# Patient Record
Sex: MALE | Race: WHITE
[De-identification: names, ages, dates, MRNs, and addresses within clinical notes are randomized per-mention and may not be internally consistent; named-entity substitution may affect disease eponyms.]

---

## 2024-07-25 ENCOUNTER — APPOINTMENT (OUTPATIENT)
Facility: HOSPITAL | Age: 74
End: 2024-07-25
Payer: MEDICARE

## 2024-07-25 ENCOUNTER — HOSPITAL ENCOUNTER (INPATIENT)
Facility: HOSPITAL | Age: 74
LOS: 7 days | Discharge: HOME | End: 2024-08-01
Attending: EMERGENCY MEDICINE | Admitting: INTERNAL MEDICINE
Payer: MEDICARE

## 2024-07-25 DIAGNOSIS — S73.004A DISLOCATION OF RIGHT HIP, INITIAL ENCOUNTER (HCC): Primary | ICD-10-CM

## 2024-07-25 PROBLEM — T84.020A: Status: ACTIVE | Noted: 2024-07-25

## 2024-07-25 LAB
ALBUMIN SERPL-MCNC: 3.2 G/DL (ref 3.5–5)
ALBUMIN/GLOB SERPL: 0.9 (ref 1.1–2.2)
ALP SERPL-CCNC: 164 U/L (ref 45–117)
ALT SERPL-CCNC: 27 U/L (ref 12–78)
ANION GAP SERPL CALC-SCNC: 6 MMOL/L (ref 5–15)
AST SERPL-CCNC: 29 U/L (ref 15–37)
BASOPHILS # BLD: 0 K/UL (ref 0–0.1)
BASOPHILS NFR BLD: 0 % (ref 0–1)
BILIRUB SERPL-MCNC: 0.4 MG/DL (ref 0.2–1)
BUN SERPL-MCNC: 23 MG/DL (ref 6–20)
BUN/CREAT SERPL: 12 (ref 12–20)
CALCIUM SERPL-MCNC: 8.6 MG/DL (ref 8.5–10.1)
CHLORIDE SERPL-SCNC: 103 MMOL/L (ref 97–108)
CO2 SERPL-SCNC: 27 MMOL/L (ref 21–32)
COMMENT:: NORMAL
CREAT SERPL-MCNC: 1.96 MG/DL (ref 0.7–1.3)
DIFFERENTIAL METHOD BLD: ABNORMAL
EOSINOPHIL # BLD: 0.1 K/UL (ref 0–0.4)
EOSINOPHIL NFR BLD: 1 % (ref 0–7)
ERYTHROCYTE [DISTWIDTH] IN BLOOD BY AUTOMATED COUNT: 13.9 % (ref 11.5–14.5)
GLOBULIN SER CALC-MCNC: 3.7 G/DL (ref 2–4)
GLUCOSE SERPL-MCNC: 171 MG/DL (ref 65–100)
HCT VFR BLD AUTO: 26.5 % (ref 36.6–50.3)
HGB BLD-MCNC: 8.3 G/DL (ref 12.1–17)
IMM GRANULOCYTES # BLD AUTO: 0.1 K/UL (ref 0–0.04)
IMM GRANULOCYTES NFR BLD AUTO: 1 % (ref 0–0.5)
LYMPHOCYTES # BLD: 1.2 K/UL (ref 0.8–3.5)
LYMPHOCYTES NFR BLD: 11 % (ref 12–49)
MCH RBC QN AUTO: 27.4 PG (ref 26–34)
MCHC RBC AUTO-ENTMCNC: 31.3 G/DL (ref 30–36.5)
MCV RBC AUTO: 87.5 FL (ref 80–99)
MONOCYTES # BLD: 0.5 K/UL (ref 0–1)
MONOCYTES NFR BLD: 5 % (ref 5–13)
NEUTS SEG # BLD: 9.3 K/UL (ref 1.8–8)
NEUTS SEG NFR BLD: 82 % (ref 32–75)
NRBC # BLD: 0 K/UL (ref 0–0.01)
NRBC BLD-RTO: 0 PER 100 WBC
PLATELET # BLD AUTO: 377 K/UL (ref 150–400)
PMV BLD AUTO: 10.6 FL (ref 8.9–12.9)
POTASSIUM SERPL-SCNC: 3.8 MMOL/L (ref 3.5–5.1)
PROT SERPL-MCNC: 6.9 G/DL (ref 6.4–8.2)
RBC # BLD AUTO: 3.03 M/UL (ref 4.1–5.7)
SODIUM SERPL-SCNC: 136 MMOL/L (ref 136–145)
SPECIMEN HOLD: NORMAL
WBC # BLD AUTO: 11.2 K/UL (ref 4.1–11.1)

## 2024-07-25 PROCEDURE — 2500000003 HC RX 250 WO HCPCS: Performed by: EMERGENCY MEDICINE

## 2024-07-25 PROCEDURE — 73502 X-RAY EXAM HIP UNI 2-3 VIEWS: CPT

## 2024-07-25 PROCEDURE — 27265 TREAT HIP DISLOCATION: CPT | Performed by: PHYSICIAN ASSISTANT

## 2024-07-25 PROCEDURE — 80053 COMPREHEN METABOLIC PANEL: CPT

## 2024-07-25 PROCEDURE — 36415 COLL VENOUS BLD VENIPUNCTURE: CPT

## 2024-07-25 PROCEDURE — 2580000003 HC RX 258: Performed by: EMERGENCY MEDICINE

## 2024-07-25 PROCEDURE — 85025 COMPLETE CBC W/AUTO DIFF WBC: CPT

## 2024-07-25 PROCEDURE — 99285 EMERGENCY DEPT VISIT HI MDM: CPT

## 2024-07-25 PROCEDURE — 73700 CT LOWER EXTREMITY W/O DYE: CPT

## 2024-07-25 PROCEDURE — 6360000002 HC RX W HCPCS: Performed by: EMERGENCY MEDICINE

## 2024-07-25 PROCEDURE — 1100000000 HC RM PRIVATE

## 2024-07-25 PROCEDURE — 73501 X-RAY EXAM HIP UNI 1 VIEW: CPT

## 2024-07-25 PROCEDURE — 96374 THER/PROPH/DIAG INJ IV PUSH: CPT

## 2024-07-25 RX ORDER — HYDROMORPHONE HYDROCHLORIDE 1 MG/ML
0.5 INJECTION, SOLUTION INTRAMUSCULAR; INTRAVENOUS; SUBCUTANEOUS
Status: COMPLETED | OUTPATIENT
Start: 2024-07-25 | End: 2024-07-25

## 2024-07-25 RX ORDER — 0.9 % SODIUM CHLORIDE 0.9 %
1000 INTRAVENOUS SOLUTION INTRAVENOUS ONCE
Status: COMPLETED | OUTPATIENT
Start: 2024-07-25 | End: 2024-07-25

## 2024-07-25 RX ADMIN — PROPOFOL 300 MG: 10 INJECTION, EMULSION INTRAVENOUS at 22:30

## 2024-07-25 RX ADMIN — HYDROMORPHONE HYDROCHLORIDE 0.5 MG: 1 INJECTION, SOLUTION INTRAMUSCULAR; INTRAVENOUS; SUBCUTANEOUS at 22:04

## 2024-07-25 RX ADMIN — SODIUM CHLORIDE 1000 ML: 9 INJECTION, SOLUTION INTRAVENOUS at 22:22

## 2024-07-25 ASSESSMENT — PAIN DESCRIPTION - ONSET: ONSET: SUDDEN

## 2024-07-25 ASSESSMENT — LIFESTYLE VARIABLES
HOW OFTEN DO YOU HAVE A DRINK CONTAINING ALCOHOL: NEVER
HOW MANY STANDARD DRINKS CONTAINING ALCOHOL DO YOU HAVE ON A TYPICAL DAY: PATIENT DOES NOT DRINK

## 2024-07-25 ASSESSMENT — PAIN SCALES - GENERAL
PAINLEVEL_OUTOF10: 10
PAINLEVEL_OUTOF10: 10

## 2024-07-25 ASSESSMENT — PAIN - FUNCTIONAL ASSESSMENT
PAIN_FUNCTIONAL_ASSESSMENT: PREVENTS OR INTERFERES WITH ALL ACTIVE AND SOME PASSIVE ACTIVITIES
PAIN_FUNCTIONAL_ASSESSMENT: PREVENTS OR INTERFERES WITH ALL ACTIVE AND SOME PASSIVE ACTIVITIES
PAIN_FUNCTIONAL_ASSESSMENT: 0-10

## 2024-07-25 ASSESSMENT — PAIN DESCRIPTION - ORIENTATION
ORIENTATION: RIGHT
ORIENTATION: RIGHT

## 2024-07-25 ASSESSMENT — PAIN DESCRIPTION - PAIN TYPE: TYPE: ACUTE PAIN

## 2024-07-25 ASSESSMENT — PAIN DESCRIPTION - LOCATION
LOCATION: HIP
LOCATION: HIP

## 2024-07-25 ASSESSMENT — PAIN DESCRIPTION - DESCRIPTORS
DESCRIPTORS: SHARP
DESCRIPTORS: SHARP

## 2024-07-25 ASSESSMENT — PAIN DESCRIPTION - FREQUENCY: FREQUENCY: CONTINUOUS

## 2024-07-26 ENCOUNTER — APPOINTMENT (OUTPATIENT)
Facility: HOSPITAL | Age: 74
End: 2024-07-26
Payer: MEDICARE

## 2024-07-26 ENCOUNTER — ANESTHESIA EVENT (OUTPATIENT)
Facility: HOSPITAL | Age: 74
End: 2024-07-26
Payer: MEDICARE

## 2024-07-26 ENCOUNTER — ANESTHESIA (OUTPATIENT)
Facility: HOSPITAL | Age: 74
End: 2024-07-26
Payer: MEDICARE

## 2024-07-26 LAB
ALBUMIN SERPL-MCNC: 2.8 G/DL (ref 3.5–5)
ALBUMIN/GLOB SERPL: 0.9 (ref 1.1–2.2)
ALP SERPL-CCNC: 146 U/L (ref 45–117)
ALT SERPL-CCNC: 29 U/L (ref 12–78)
ANION GAP SERPL CALC-SCNC: 6 MMOL/L (ref 5–15)
AST SERPL-CCNC: 30 U/L (ref 15–37)
BASOPHILS # BLD: 0 K/UL (ref 0–0.1)
BASOPHILS NFR BLD: 0 % (ref 0–1)
BILIRUB SERPL-MCNC: 0.3 MG/DL (ref 0.2–1)
BUN SERPL-MCNC: 26 MG/DL (ref 6–20)
BUN/CREAT SERPL: 15 (ref 12–20)
CALCIUM SERPL-MCNC: 8.7 MG/DL (ref 8.5–10.1)
CHLORIDE SERPL-SCNC: 107 MMOL/L (ref 97–108)
CO2 SERPL-SCNC: 26 MMOL/L (ref 21–32)
CREAT SERPL-MCNC: 1.74 MG/DL (ref 0.7–1.3)
DIFFERENTIAL METHOD BLD: ABNORMAL
EOSINOPHIL # BLD: 0.1 K/UL (ref 0–0.4)
EOSINOPHIL NFR BLD: 2 % (ref 0–7)
ERYTHROCYTE [DISTWIDTH] IN BLOOD BY AUTOMATED COUNT: 13.9 % (ref 11.5–14.5)
EST. AVERAGE GLUCOSE BLD GHB EST-MCNC: 114 MG/DL
GLOBULIN SER CALC-MCNC: 3.2 G/DL (ref 2–4)
GLUCOSE BLD STRIP.AUTO-MCNC: 129 MG/DL (ref 65–117)
GLUCOSE BLD STRIP.AUTO-MCNC: 136 MG/DL (ref 65–117)
GLUCOSE BLD STRIP.AUTO-MCNC: 158 MG/DL (ref 65–117)
GLUCOSE BLD STRIP.AUTO-MCNC: 172 MG/DL (ref 65–117)
GLUCOSE BLD STRIP.AUTO-MCNC: 178 MG/DL (ref 65–117)
GLUCOSE SERPL-MCNC: 128 MG/DL (ref 65–100)
HBA1C MFR BLD: 5.6 % (ref 4–5.6)
HCT VFR BLD AUTO: 24.5 % (ref 36.6–50.3)
HGB BLD-MCNC: 7.6 G/DL (ref 12.1–17)
IMM GRANULOCYTES # BLD AUTO: 0 K/UL (ref 0–0.04)
IMM GRANULOCYTES NFR BLD AUTO: 1 % (ref 0–0.5)
LYMPHOCYTES # BLD: 1.7 K/UL (ref 0.8–3.5)
LYMPHOCYTES NFR BLD: 19 % (ref 12–49)
MAGNESIUM SERPL-MCNC: 2 MG/DL (ref 1.6–2.4)
MCH RBC QN AUTO: 27.1 PG (ref 26–34)
MCHC RBC AUTO-ENTMCNC: 31 G/DL (ref 30–36.5)
MCV RBC AUTO: 87.5 FL (ref 80–99)
MONOCYTES # BLD: 0.5 K/UL (ref 0–1)
MONOCYTES NFR BLD: 6 % (ref 5–13)
NEUTS SEG # BLD: 6.4 K/UL (ref 1.8–8)
NEUTS SEG NFR BLD: 72 % (ref 32–75)
NRBC # BLD: 0 K/UL (ref 0–0.01)
NRBC BLD-RTO: 0 PER 100 WBC
PHOSPHATE SERPL-MCNC: 3.2 MG/DL (ref 2.6–4.7)
PLATELET # BLD AUTO: 328 K/UL (ref 150–400)
PMV BLD AUTO: 11.2 FL (ref 8.9–12.9)
POTASSIUM SERPL-SCNC: 3.9 MMOL/L (ref 3.5–5.1)
PROT SERPL-MCNC: 6 G/DL (ref 6.4–8.2)
RBC # BLD AUTO: 2.8 M/UL (ref 4.1–5.7)
SERVICE CMNT-IMP: ABNORMAL
SODIUM SERPL-SCNC: 139 MMOL/L (ref 136–145)
TSH SERPL DL<=0.05 MIU/L-ACNC: 1.53 UIU/ML (ref 0.36–3.74)
WBC # BLD AUTO: 8.7 K/UL (ref 4.1–11.1)

## 2024-07-26 PROCEDURE — 3600000002 HC SURGERY LEVEL 2 BASE: Performed by: ORTHOPAEDIC SURGERY

## 2024-07-26 PROCEDURE — 2500000003 HC RX 250 WO HCPCS: Performed by: INTERNAL MEDICINE

## 2024-07-26 PROCEDURE — 82962 GLUCOSE BLOOD TEST: CPT

## 2024-07-26 PROCEDURE — 6370000000 HC RX 637 (ALT 250 FOR IP): Performed by: INTERNAL MEDICINE

## 2024-07-26 PROCEDURE — 6360000002 HC RX W HCPCS

## 2024-07-26 PROCEDURE — 2500000003 HC RX 250 WO HCPCS

## 2024-07-26 PROCEDURE — 85025 COMPLETE CBC W/AUTO DIFF WBC: CPT

## 2024-07-26 PROCEDURE — 2580000003 HC RX 258

## 2024-07-26 PROCEDURE — 97161 PT EVAL LOW COMPLEX 20 MIN: CPT

## 2024-07-26 PROCEDURE — 84443 ASSAY THYROID STIM HORMONE: CPT

## 2024-07-26 PROCEDURE — 83735 ASSAY OF MAGNESIUM: CPT

## 2024-07-26 PROCEDURE — 0SW9XJZ REVISION OF SYNTHETIC SUBSTITUTE IN RIGHT HIP JOINT, EXTERNAL APPROACH: ICD-10-PCS | Performed by: ORTHOPAEDIC SURGERY

## 2024-07-26 PROCEDURE — 7100000000 HC PACU RECOVERY - FIRST 15 MIN: Performed by: ORTHOPAEDIC SURGERY

## 2024-07-26 PROCEDURE — 97116 GAIT TRAINING THERAPY: CPT

## 2024-07-26 PROCEDURE — 2580000003 HC RX 258: Performed by: INTERNAL MEDICINE

## 2024-07-26 PROCEDURE — 1100000000 HC RM PRIVATE

## 2024-07-26 PROCEDURE — 6360000002 HC RX W HCPCS: Performed by: ANESTHESIOLOGY

## 2024-07-26 PROCEDURE — 7100000001 HC PACU RECOVERY - ADDTL 15 MIN: Performed by: ORTHOPAEDIC SURGERY

## 2024-07-26 PROCEDURE — 80053 COMPREHEN METABOLIC PANEL: CPT

## 2024-07-26 PROCEDURE — 6360000002 HC RX W HCPCS: Performed by: ORTHOPAEDIC SURGERY

## 2024-07-26 PROCEDURE — 3700000000 HC ANESTHESIA ATTENDED CARE: Performed by: ORTHOPAEDIC SURGERY

## 2024-07-26 PROCEDURE — 3600000012 HC SURGERY LEVEL 2 ADDTL 15MIN: Performed by: ORTHOPAEDIC SURGERY

## 2024-07-26 PROCEDURE — 36415 COLL VENOUS BLD VENIPUNCTURE: CPT

## 2024-07-26 PROCEDURE — 84100 ASSAY OF PHOSPHORUS: CPT

## 2024-07-26 PROCEDURE — 83036 HEMOGLOBIN GLYCOSYLATED A1C: CPT

## 2024-07-26 PROCEDURE — 97530 THERAPEUTIC ACTIVITIES: CPT

## 2024-07-26 PROCEDURE — 3700000001 HC ADD 15 MINUTES (ANESTHESIA): Performed by: ORTHOPAEDIC SURGERY

## 2024-07-26 PROCEDURE — 2580000003 HC RX 258: Performed by: ORTHOPAEDIC SURGERY

## 2024-07-26 RX ORDER — LEVOTHYROXINE SODIUM 0.05 MG/1
50 TABLET ORAL
COMMUNITY
Start: 2021-05-21

## 2024-07-26 RX ORDER — ONDANSETRON 4 MG/1
4 TABLET, ORALLY DISINTEGRATING ORAL EVERY 8 HOURS PRN
Status: DISCONTINUED | OUTPATIENT
Start: 2024-07-26 | End: 2024-08-01 | Stop reason: HOSPADM

## 2024-07-26 RX ORDER — PROPOFOL 10 MG/ML
INJECTION, EMULSION INTRAVENOUS PRN
Status: DISCONTINUED | OUTPATIENT
Start: 2024-07-26 | End: 2024-07-26 | Stop reason: SDUPTHER

## 2024-07-26 RX ORDER — HYDROMORPHONE HYDROCHLORIDE 1 MG/ML
1 INJECTION, SOLUTION INTRAMUSCULAR; INTRAVENOUS; SUBCUTANEOUS EVERY 4 HOURS PRN
Status: DISCONTINUED | OUTPATIENT
Start: 2024-07-26 | End: 2024-07-26

## 2024-07-26 RX ORDER — SODIUM CHLORIDE 0.9 % (FLUSH) 0.9 %
5-40 SYRINGE (ML) INJECTION PRN
Status: DISCONTINUED | OUTPATIENT
Start: 2024-07-26 | End: 2024-07-26 | Stop reason: HOSPADM

## 2024-07-26 RX ORDER — SODIUM CHLORIDE 9 MG/ML
INJECTION, SOLUTION INTRAVENOUS PRN
Status: DISCONTINUED | OUTPATIENT
Start: 2024-07-26 | End: 2024-08-01 | Stop reason: HOSPADM

## 2024-07-26 RX ORDER — POTASSIUM CHLORIDE 750 MG/1
40 TABLET, FILM COATED, EXTENDED RELEASE ORAL PRN
Status: DISCONTINUED | OUTPATIENT
Start: 2024-07-26 | End: 2024-08-01 | Stop reason: HOSPADM

## 2024-07-26 RX ORDER — FENOFIBRATE 48 MG/1
96 TABLET, COATED ORAL
COMMUNITY
Start: 2024-07-03

## 2024-07-26 RX ORDER — LURASIDONE HYDROCHLORIDE 80 MG/1
80 TABLET, FILM COATED ORAL
Status: DISCONTINUED | OUTPATIENT
Start: 2024-07-26 | End: 2024-08-01 | Stop reason: HOSPADM

## 2024-07-26 RX ORDER — LIDOCAINE HYDROCHLORIDE 20 MG/ML
INJECTION, SOLUTION EPIDURAL; INFILTRATION; INTRACAUDAL; PERINEURAL PRN
Status: DISCONTINUED | OUTPATIENT
Start: 2024-07-26 | End: 2024-07-26 | Stop reason: SDUPTHER

## 2024-07-26 RX ORDER — SERTRALINE HYDROCHLORIDE 100 MG/1
200 TABLET, FILM COATED ORAL
COMMUNITY
Start: 2024-05-02

## 2024-07-26 RX ORDER — ASPIRIN 81 MG/1
81 TABLET, CHEWABLE ORAL DAILY
Status: DISCONTINUED | OUTPATIENT
Start: 2024-07-26 | End: 2024-08-01 | Stop reason: HOSPADM

## 2024-07-26 RX ORDER — LURASIDONE HYDROCHLORIDE 80 MG/1
80 TABLET, FILM COATED ORAL
COMMUNITY
Start: 2024-01-26

## 2024-07-26 RX ORDER — SODIUM CHLORIDE, SODIUM LACTATE, POTASSIUM CHLORIDE, CALCIUM CHLORIDE 600; 310; 30; 20 MG/100ML; MG/100ML; MG/100ML; MG/100ML
INJECTION, SOLUTION INTRAVENOUS CONTINUOUS
Status: DISCONTINUED | OUTPATIENT
Start: 2024-07-26 | End: 2024-07-30

## 2024-07-26 RX ORDER — FENTANYL CITRATE 50 UG/ML
25 INJECTION, SOLUTION INTRAMUSCULAR; INTRAVENOUS EVERY 5 MIN PRN
Status: DISCONTINUED | OUTPATIENT
Start: 2024-07-26 | End: 2024-07-26 | Stop reason: HOSPADM

## 2024-07-26 RX ORDER — PANTOPRAZOLE SODIUM 40 MG/1
40 TABLET, DELAYED RELEASE ORAL
COMMUNITY
Start: 2021-05-21

## 2024-07-26 RX ORDER — POTASSIUM CHLORIDE 7.45 MG/ML
10 INJECTION INTRAVENOUS PRN
Status: DISCONTINUED | OUTPATIENT
Start: 2024-07-26 | End: 2024-08-01 | Stop reason: HOSPADM

## 2024-07-26 RX ORDER — SODIUM CHLORIDE 0.9 % (FLUSH) 0.9 %
5-40 SYRINGE (ML) INJECTION EVERY 12 HOURS SCHEDULED
Status: DISCONTINUED | OUTPATIENT
Start: 2024-07-26 | End: 2024-07-26 | Stop reason: HOSPADM

## 2024-07-26 RX ORDER — ENOXAPARIN SODIUM 100 MG/ML
40 INJECTION SUBCUTANEOUS DAILY
Status: DISCONTINUED | OUTPATIENT
Start: 2024-07-26 | End: 2024-08-01 | Stop reason: HOSPADM

## 2024-07-26 RX ORDER — HYDROMORPHONE HYDROCHLORIDE 1 MG/ML
1 INJECTION, SOLUTION INTRAMUSCULAR; INTRAVENOUS; SUBCUTANEOUS EVERY 4 HOURS PRN
Status: DISCONTINUED | OUTPATIENT
Start: 2024-07-26 | End: 2024-07-30

## 2024-07-26 RX ORDER — ATORVASTATIN CALCIUM 80 MG/1
80 TABLET, FILM COATED ORAL
COMMUNITY
Start: 2021-05-21

## 2024-07-26 RX ORDER — ONDANSETRON 2 MG/ML
4 INJECTION INTRAMUSCULAR; INTRAVENOUS
Status: DISCONTINUED | OUTPATIENT
Start: 2024-07-26 | End: 2024-07-26 | Stop reason: HOSPADM

## 2024-07-26 RX ORDER — SODIUM CHLORIDE 9 MG/ML
INJECTION, SOLUTION INTRAVENOUS CONTINUOUS
Status: DISPENSED | OUTPATIENT
Start: 2024-07-26 | End: 2024-07-27

## 2024-07-26 RX ORDER — TOPIRAMATE 50 MG/1
50 TABLET, FILM COATED ORAL
COMMUNITY
Start: 2021-05-21

## 2024-07-26 RX ORDER — INSULIN LISPRO 100 [IU]/ML
0-8 INJECTION, SOLUTION INTRAVENOUS; SUBCUTANEOUS
Status: DISCONTINUED | OUTPATIENT
Start: 2024-07-26 | End: 2024-08-01 | Stop reason: HOSPADM

## 2024-07-26 RX ORDER — ROCURONIUM BROMIDE 10 MG/ML
INJECTION, SOLUTION INTRAVENOUS PRN
Status: DISCONTINUED | OUTPATIENT
Start: 2024-07-26 | End: 2024-07-26 | Stop reason: SDUPTHER

## 2024-07-26 RX ORDER — SODIUM CHLORIDE 9 MG/ML
INJECTION, SOLUTION INTRAVENOUS PRN
Status: DISCONTINUED | OUTPATIENT
Start: 2024-07-26 | End: 2024-07-26 | Stop reason: HOSPADM

## 2024-07-26 RX ORDER — ALBUTEROL SULFATE 90 UG/1
AEROSOL, METERED RESPIRATORY (INHALATION)
COMMUNITY
Start: 2020-03-03

## 2024-07-26 RX ORDER — ASPIRIN 81 MG/1
81 TABLET, CHEWABLE ORAL
COMMUNITY
Start: 2020-03-03

## 2024-07-26 RX ORDER — POLYETHYLENE GLYCOL 3350 17 G/17G
17 POWDER, FOR SOLUTION ORAL DAILY PRN
Status: DISCONTINUED | OUTPATIENT
Start: 2024-07-26 | End: 2024-08-01 | Stop reason: HOSPADM

## 2024-07-26 RX ORDER — OXYCODONE HYDROCHLORIDE 5 MG/1
10 TABLET ORAL EVERY 4 HOURS PRN
Status: DISCONTINUED | OUTPATIENT
Start: 2024-07-26 | End: 2024-07-26

## 2024-07-26 RX ORDER — ONDANSETRON 2 MG/ML
4 INJECTION INTRAMUSCULAR; INTRAVENOUS EVERY 6 HOURS PRN
Status: DISCONTINUED | OUTPATIENT
Start: 2024-07-26 | End: 2024-08-01 | Stop reason: HOSPADM

## 2024-07-26 RX ORDER — ATORVASTATIN CALCIUM 40 MG/1
80 TABLET, FILM COATED ORAL NIGHTLY
Status: DISCONTINUED | OUTPATIENT
Start: 2024-07-26 | End: 2024-08-01 | Stop reason: HOSPADM

## 2024-07-26 RX ORDER — VENLAFAXINE HYDROCHLORIDE 37.5 MG/1
75 CAPSULE, EXTENDED RELEASE ORAL
Status: DISCONTINUED | OUTPATIENT
Start: 2024-07-26 | End: 2024-07-26

## 2024-07-26 RX ORDER — SODIUM CHLORIDE 0.9 % (FLUSH) 0.9 %
5-40 SYRINGE (ML) INJECTION PRN
Status: DISCONTINUED | OUTPATIENT
Start: 2024-07-26 | End: 2024-08-01 | Stop reason: HOSPADM

## 2024-07-26 RX ORDER — SODIUM CHLORIDE 0.9 % (FLUSH) 0.9 %
5-40 SYRINGE (ML) INJECTION EVERY 12 HOURS SCHEDULED
Status: DISCONTINUED | OUTPATIENT
Start: 2024-07-26 | End: 2024-08-01 | Stop reason: HOSPADM

## 2024-07-26 RX ORDER — DEXTROSE MONOHYDRATE 100 MG/ML
INJECTION, SOLUTION INTRAVENOUS CONTINUOUS PRN
Status: DISCONTINUED | OUTPATIENT
Start: 2024-07-26 | End: 2024-08-01 | Stop reason: HOSPADM

## 2024-07-26 RX ORDER — ALBUTEROL SULFATE 90 UG/1
2 AEROSOL, METERED RESPIRATORY (INHALATION) EVERY 6 HOURS PRN
Status: DISCONTINUED | OUTPATIENT
Start: 2024-07-26 | End: 2024-08-01 | Stop reason: HOSPADM

## 2024-07-26 RX ORDER — PANTOPRAZOLE SODIUM 40 MG/1
40 TABLET, DELAYED RELEASE ORAL
Status: DISCONTINUED | OUTPATIENT
Start: 2024-07-26 | End: 2024-08-01 | Stop reason: HOSPADM

## 2024-07-26 RX ORDER — VENLAFAXINE HYDROCHLORIDE 75 MG/1
75 CAPSULE, EXTENDED RELEASE ORAL
Status: ON HOLD | COMMUNITY
Start: 2021-05-21 | End: 2024-07-26 | Stop reason: ALTCHOICE

## 2024-07-26 RX ORDER — INSULIN LISPRO 100 [IU]/ML
0-4 INJECTION, SOLUTION INTRAVENOUS; SUBCUTANEOUS NIGHTLY
Status: DISCONTINUED | OUTPATIENT
Start: 2024-07-26 | End: 2024-08-01 | Stop reason: HOSPADM

## 2024-07-26 RX ORDER — FENTANYL CITRATE 50 UG/ML
INJECTION, SOLUTION INTRAMUSCULAR; INTRAVENOUS PRN
Status: DISCONTINUED | OUTPATIENT
Start: 2024-07-26 | End: 2024-07-26 | Stop reason: SDUPTHER

## 2024-07-26 RX ORDER — FINASTERIDE 5 MG/1
5 TABLET, FILM COATED ORAL
COMMUNITY
Start: 2024-07-03

## 2024-07-26 RX ORDER — PHENOL 1.4 %
10 AEROSOL, SPRAY (ML) MUCOUS MEMBRANE NIGHTLY
COMMUNITY
Start: 2024-07-10

## 2024-07-26 RX ORDER — ACETAMINOPHEN 650 MG/1
650 SUPPOSITORY RECTAL EVERY 6 HOURS PRN
Status: DISCONTINUED | OUTPATIENT
Start: 2024-07-26 | End: 2024-08-01 | Stop reason: HOSPADM

## 2024-07-26 RX ORDER — HYDRALAZINE HYDROCHLORIDE 20 MG/ML
10 INJECTION INTRAMUSCULAR; INTRAVENOUS ONCE
Status: DISCONTINUED | OUTPATIENT
Start: 2024-07-26 | End: 2024-07-26 | Stop reason: HOSPADM

## 2024-07-26 RX ORDER — NALOXONE HYDROCHLORIDE 0.4 MG/ML
INJECTION, SOLUTION INTRAMUSCULAR; INTRAVENOUS; SUBCUTANEOUS PRN
Status: DISCONTINUED | OUTPATIENT
Start: 2024-07-26 | End: 2024-07-26 | Stop reason: HOSPADM

## 2024-07-26 RX ORDER — SUCCINYLCHOLINE/SOD CL,ISO/PF 200MG/10ML
SYRINGE (ML) INTRAVENOUS PRN
Status: DISCONTINUED | OUTPATIENT
Start: 2024-07-26 | End: 2024-07-26 | Stop reason: SDUPTHER

## 2024-07-26 RX ORDER — FENOFIBRATE 54 MG/1
54 TABLET ORAL DAILY
Status: DISCONTINUED | OUTPATIENT
Start: 2024-07-26 | End: 2024-08-01 | Stop reason: HOSPADM

## 2024-07-26 RX ORDER — HYDROMORPHONE HYDROCHLORIDE 1 MG/ML
0.5 INJECTION, SOLUTION INTRAMUSCULAR; INTRAVENOUS; SUBCUTANEOUS EVERY 5 MIN PRN
Status: DISCONTINUED | OUTPATIENT
Start: 2024-07-26 | End: 2024-07-26 | Stop reason: HOSPADM

## 2024-07-26 RX ORDER — MAGNESIUM SULFATE IN WATER 40 MG/ML
2000 INJECTION, SOLUTION INTRAVENOUS PRN
Status: DISCONTINUED | OUTPATIENT
Start: 2024-07-26 | End: 2024-08-01 | Stop reason: HOSPADM

## 2024-07-26 RX ORDER — OXYCODONE HYDROCHLORIDE 5 MG/1
5 TABLET ORAL
Status: DISCONTINUED | OUTPATIENT
Start: 2024-07-26 | End: 2024-07-26 | Stop reason: HOSPADM

## 2024-07-26 RX ORDER — SODIUM CHLORIDE, SODIUM LACTATE, POTASSIUM CHLORIDE, CALCIUM CHLORIDE 600; 310; 30; 20 MG/100ML; MG/100ML; MG/100ML; MG/100ML
INJECTION, SOLUTION INTRAVENOUS CONTINUOUS PRN
Status: DISCONTINUED | OUTPATIENT
Start: 2024-07-26 | End: 2024-07-26 | Stop reason: SDUPTHER

## 2024-07-26 RX ORDER — FERROUS SULFATE 325(65) MG
325 TABLET, DELAYED RELEASE (ENTERIC COATED) ORAL
COMMUNITY
Start: 2024-07-03

## 2024-07-26 RX ORDER — BUPROPION HYDROCHLORIDE 150 MG/1
150 TABLET ORAL DAILY
Status: DISCONTINUED | OUTPATIENT
Start: 2024-07-26 | End: 2024-08-01 | Stop reason: HOSPADM

## 2024-07-26 RX ORDER — OXYCODONE HYDROCHLORIDE 5 MG/1
2.5 TABLET ORAL EVERY 6 HOURS PRN
COMMUNITY
Start: 2024-07-22 | End: 2024-08-01

## 2024-07-26 RX ORDER — OXYCODONE HYDROCHLORIDE 5 MG/1
5 TABLET ORAL EVERY 4 HOURS PRN
Status: DISCONTINUED | OUTPATIENT
Start: 2024-07-26 | End: 2024-07-30

## 2024-07-26 RX ORDER — PROPRANOLOL HYDROCHLORIDE 20 MG/1
20 TABLET ORAL
COMMUNITY
Start: 2021-05-21

## 2024-07-26 RX ORDER — LEVOTHYROXINE SODIUM 0.05 MG/1
50 TABLET ORAL DAILY
Status: DISCONTINUED | OUTPATIENT
Start: 2024-07-26 | End: 2024-08-01 | Stop reason: HOSPADM

## 2024-07-26 RX ORDER — FINASTERIDE 5 MG/1
5 TABLET, FILM COATED ORAL DAILY
Status: DISCONTINUED | OUTPATIENT
Start: 2024-07-26 | End: 2024-08-01 | Stop reason: HOSPADM

## 2024-07-26 RX ORDER — PSEUDOEPHEDRINE HCL 30 MG
200 TABLET ORAL
COMMUNITY
Start: 2024-07-03

## 2024-07-26 RX ORDER — ACETAMINOPHEN 325 MG/1
650 TABLET ORAL EVERY 6 HOURS PRN
Status: DISCONTINUED | OUTPATIENT
Start: 2024-07-26 | End: 2024-08-01 | Stop reason: HOSPADM

## 2024-07-26 RX ORDER — FERROUS SULFATE 325(65) MG
325 TABLET, DELAYED RELEASE (ENTERIC COATED) ORAL
Status: DISCONTINUED | OUTPATIENT
Start: 2024-07-26 | End: 2024-07-27

## 2024-07-26 RX ORDER — BUPROPION HYDROCHLORIDE 150 MG/1
150 TABLET ORAL
COMMUNITY
Start: 2024-07-10

## 2024-07-26 RX ORDER — ONDANSETRON 2 MG/ML
INJECTION INTRAMUSCULAR; INTRAVENOUS PRN
Status: DISCONTINUED | OUTPATIENT
Start: 2024-07-26 | End: 2024-07-26 | Stop reason: SDUPTHER

## 2024-07-26 RX ORDER — PROCHLORPERAZINE EDISYLATE 5 MG/ML
5 INJECTION INTRAMUSCULAR; INTRAVENOUS
Status: DISCONTINUED | OUTPATIENT
Start: 2024-07-26 | End: 2024-07-26 | Stop reason: HOSPADM

## 2024-07-26 RX ORDER — 0.9 % SODIUM CHLORIDE 0.9 %
500 INTRAVENOUS SOLUTION INTRAVENOUS ONCE
Status: DISCONTINUED | OUTPATIENT
Start: 2024-07-26 | End: 2024-08-01 | Stop reason: HOSPADM

## 2024-07-26 RX ADMIN — HYDROMORPHONE HYDROCHLORIDE 1 MG: 1 INJECTION, SOLUTION INTRAMUSCULAR; INTRAVENOUS; SUBCUTANEOUS at 02:31

## 2024-07-26 RX ADMIN — SUGAMMADEX 300 MG: 100 INJECTION, SOLUTION INTRAVENOUS at 07:53

## 2024-07-26 RX ADMIN — Medication 120 MG: at 07:35

## 2024-07-26 RX ADMIN — BUPROPION HYDROCHLORIDE 150 MG: 150 TABLET, EXTENDED RELEASE ORAL at 11:33

## 2024-07-26 RX ADMIN — FENTANYL CITRATE 25 MCG: 50 INJECTION INTRAMUSCULAR; INTRAVENOUS at 09:47

## 2024-07-26 RX ADMIN — SODIUM CHLORIDE: 9 INJECTION, SOLUTION INTRAVENOUS at 21:16

## 2024-07-26 RX ADMIN — FENTANYL CITRATE 50 MCG: 50 INJECTION, SOLUTION INTRAMUSCULAR; INTRAVENOUS at 07:32

## 2024-07-26 RX ADMIN — FENOFIBRATE 54 MG: 54 TABLET ORAL at 13:52

## 2024-07-26 RX ADMIN — SERTRALINE HYDROCHLORIDE 200 MG: 50 TABLET ORAL at 11:33

## 2024-07-26 RX ADMIN — ENOXAPARIN SODIUM 40 MG: 100 INJECTION SUBCUTANEOUS at 11:36

## 2024-07-26 RX ADMIN — SODIUM CHLORIDE, PRESERVATIVE FREE 10 ML: 5 INJECTION INTRAVENOUS at 21:17

## 2024-07-26 RX ADMIN — SODIUM CHLORIDE, POTASSIUM CHLORIDE, SODIUM LACTATE AND CALCIUM CHLORIDE: 600; 310; 30; 20 INJECTION, SOLUTION INTRAVENOUS at 07:30

## 2024-07-26 RX ADMIN — LURASIDONE HYDROCHLORIDE 80 MG: 80 TABLET ORAL at 11:35

## 2024-07-26 RX ADMIN — OXYCODONE 5 MG: 5 TABLET ORAL at 21:19

## 2024-07-26 RX ADMIN — HYDROMORPHONE HYDROCHLORIDE 0.5 MG: 1 INJECTION, SOLUTION INTRAMUSCULAR; INTRAVENOUS; SUBCUTANEOUS at 08:22

## 2024-07-26 RX ADMIN — PROPOFOL 140 MG: 10 INJECTION, EMULSION INTRAVENOUS at 07:35

## 2024-07-26 RX ADMIN — FENTANYL CITRATE 50 MCG: 50 INJECTION, SOLUTION INTRAMUSCULAR; INTRAVENOUS at 07:50

## 2024-07-26 RX ADMIN — ASPIRIN 81 MG CHEWABLE TABLET 81 MG: 81 TABLET CHEWABLE at 11:36

## 2024-07-26 RX ADMIN — ROCURONIUM BROMIDE 10 MG: 10 INJECTION, SOLUTION INTRAVENOUS at 07:35

## 2024-07-26 RX ADMIN — ONDANSETRON 4 MG: 2 INJECTION INTRAMUSCULAR; INTRAVENOUS at 07:53

## 2024-07-26 RX ADMIN — ROCURONIUM BROMIDE 30 MG: 10 INJECTION, SOLUTION INTRAVENOUS at 07:44

## 2024-07-26 RX ADMIN — OXYCODONE 5 MG: 5 TABLET ORAL at 16:25

## 2024-07-26 RX ADMIN — OXYCODONE 5 MG: 5 TABLET ORAL at 11:35

## 2024-07-26 RX ADMIN — HYDROMORPHONE HYDROCHLORIDE 0.5 MG: 1 INJECTION, SOLUTION INTRAMUSCULAR; INTRAVENOUS; SUBCUTANEOUS at 08:05

## 2024-07-26 RX ADMIN — SODIUM CHLORIDE: 9 INJECTION, SOLUTION INTRAVENOUS at 12:10

## 2024-07-26 RX ADMIN — ATORVASTATIN CALCIUM 80 MG: 40 TABLET, FILM COATED ORAL at 21:16

## 2024-07-26 RX ADMIN — FINASTERIDE 5 MG: 5 TABLET, FILM COATED ORAL at 11:37

## 2024-07-26 RX ADMIN — LIDOCAINE HYDROCHLORIDE 100 MG: 20 INJECTION, SOLUTION EPIDURAL; INFILTRATION; INTRACAUDAL; PERINEURAL at 07:35

## 2024-07-26 ASSESSMENT — PAIN DESCRIPTION - LOCATION
LOCATION: HIP

## 2024-07-26 ASSESSMENT — PAIN DESCRIPTION - DESCRIPTORS
DESCRIPTORS: ACHING;THROBBING
DESCRIPTORS: SORE
DESCRIPTORS: ACHING;THROBBING
DESCRIPTORS: ACHING
DESCRIPTORS: ACHING;SORE

## 2024-07-26 ASSESSMENT — PAIN DESCRIPTION - ORIENTATION
ORIENTATION: RIGHT

## 2024-07-26 ASSESSMENT — PAIN SCALES - GENERAL
PAINLEVEL_OUTOF10: 4
PAINLEVEL_OUTOF10: 9
PAINLEVEL_OUTOF10: 8
PAINLEVEL_OUTOF10: 4
PAINLEVEL_OUTOF10: 7
PAINLEVEL_OUTOF10: 7
PAINLEVEL_OUTOF10: 3
PAINLEVEL_OUTOF10: 7
PAINLEVEL_OUTOF10: 5
PAINLEVEL_OUTOF10: 6

## 2024-07-26 ASSESSMENT — PAIN DESCRIPTION - PAIN TYPE: TYPE: SURGICAL PAIN

## 2024-07-26 ASSESSMENT — PAIN - FUNCTIONAL ASSESSMENT: PAIN_FUNCTIONAL_ASSESSMENT: 0-10

## 2024-07-26 NOTE — ANESTHESIA PRE PROCEDURE
Department of Anesthesiology  Preprocedure Note       Name:  Jayden Thompson   Age:  73 y.o.  :  1950                                          MRN:  [id number]         Date:  2024      Surgeon: Surgeon(s):  Andres Armenta MD    Procedure: Procedure(s):  RIGHT HIP CLOSED REDUCTION; REQ EARLIEST POSSIBLE    Medications prior to admission:   Prior to Admission medications    Medication Sig Start Date End Date Taking? Authorizing Provider   venlafaxine (EFFEXOR XR) 75 MG extended release capsule Take 1 capsule by mouth 21  Yes Provider, Historical, MD   topiramate (TOPAMAX) 50 MG tablet Take 1 tablet by mouth 21  Yes Provider, Historical, MD   tiotropium-olodaterol (STIOLTO) 2.5-2.5 MCG/ACT AERS Inhale into the lungs 21  Yes Provider, Historical, MD   sertraline (ZOLOFT) 100 MG tablet Take 2 tablets by mouth 24  Yes Provider, Historical, MD   propranolol (INDERAL) 20 MG tablet Take 1 tablet by mouth 21  Yes Provider, Historical, MD   pantoprazole (PROTONIX) 40 MG tablet Take 1 tablet by mouth 21  Yes Provider, Historical, MD   oxyCODONE (ROXICODONE) 5 MG immediate release tablet Take 0.5 tablets by mouth every 6 hours as needed. Max Daily Amount: 10 mg 24 Yes Provider, Historical, MD   metFORMIN (GLUCOPHAGE) 1000 MG tablet Take 1 tablet by mouth 21  Yes Provider, Historical, MD   Melatonin 10 MG TABS Take 10 mg by mouth nightly 7/10/24  Yes Provider, Historical, MD   lurasidone (LATUDA) 80 MG TABS tablet Take 1 tablet by mouth 24  Yes Provider, Historical, MD   levothyroxine (SYNTHROID) 50 MCG tablet Take 1 tablet by mouth 21  Yes Provider, Historical, MD   finasteride (PROSCAR) 5 MG tablet Take 1 tablet by mouth 7/3/24  Yes Provider, Historical, MD   ferrous sulfate (FE TABS 325) 325 (65 Fe) MG EC tablet Take 1 tablet by mouth every 48 hours 7/3/24  Yes Provider, Historical, MD   fenofibrate (TRICOR) 48 MG tablet Take 2 tablets by mouth 7/3/24  Yes

## 2024-07-26 NOTE — FLOWSHEET NOTE
Dressing from previous procedure performed at [facility] intact, not removed. Noted bruising and swollen reddened areas around dressing. MD notified prior to procedure.   Knee immobilizer and wedge pillow placed post operatively.

## 2024-07-26 NOTE — ED TRIAGE NOTES
Patient BIBA from [facility] with a CC of R hip dislocation. Per EMS he was leaning over in bed to pick something up when he felt a pop. EMS stated he has shortening and deformity of the R leg and hip.       Patient had surgery on R hip for dislocation last week.

## 2024-07-26 NOTE — PLAN OF CARE
Problem: Physical Therapy - Adult  Goal: By Discharge: Performs mobility at highest level of function for planned discharge setting.  See evaluation for individualized goals.  Description: FUNCTIONAL STATUS PRIOR TO ADMISSION: Patient was modified independent using a rolling walker for functional mobility.  Pt poor historian.  Per case management - pt transferred from [place].    HOME SUPPORT PRIOR TO ADMISSION: Per pt - pt lived in group home.    Physical Therapy Goals  Initiated 7/26/2024  1.  Patient will move from supine to sit and sit to supine and scoot up and down in bed with modified independence within 7 day(s).    2.  Patient will perform sit to stand with modified independence within 7 day(s).  3.  Patient will transfer from bed to chair and chair to bed with modified independence using the least restrictive device within 7 day(s).  4.  Patient will ambulate with supervision for 125 feet with the least restrictive device within 7 day(s).   5.  Patient will ascend/descend 4 stairs with one handrail(s) with supervision within 7 day(s).  6.  Patient will perform THR home exercise program per protocol with supervision/set-up within 7 days.  7. Patient will verbalize and demonstrate understanding of posterior hip precautions per protocol within 4 days.     PHYSICAL THERAPY EVALUATION    Patient: Jayden Thompson (73 y.o. 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  Moving from lying on your back to sitting on the side of a flat bed without using bedrails? []  1 [x]  2 []  3  []  4   3.  Moving to and from a bed to a chair (including a wheelchair)? []  1 [x]  2 []  3  []  4   4. Standing up from a chair using your arms (e.g. wheelchair or bedside chair)? []  1 [x]  2 []  3  []  4   5.  Walking in hospital room? []  1 [x]  2 []  3  []  4   6.  Climbing 3-5 steps with a railing? []  1 [x]  2 []  3  []  4     Raw Score: 12/24                            Cutoff score ?171,2,3 had higher odds of discharging home with home health or need of SNF/IPR.    1. Anamika Graff, Leanna Patton, Erich Austin, Alesia Galdamez, Gallo Solis, Jack Graff.  Validity of the AM-PAC “6-Clicks” Inpatient Daily Activity and Basic Mobility Short Forms. Physical Therapy Mar 2014, 94  379-391; DOI: 10.2522/ptj.56662140  2. Walter CHAPARRO, Soledad J, Hill J, Roland J. Association of AM-PAC \"6-Clicks\" Basic Mobility and Daily Activity Scores With Discharge Destination. Phys Ther. 2021 Apr 4;101(4):iats285. doi: 10.1093/ptj/bakj925. PMID: 71682087.  3. Jabari ARIAS, Ja D, Jayshree S, Fareed K, Denise S. Activity Measure for Post-Acute Care \"6-Clicks\" Basic Mobility Scores Predict Discharge Destination After Acute Care Hospitalization in Select Patient Groups: A Retrospective, Observational Study. Arch Rehabil Res Clin Transl. 2022 Jul 16;4(3):005589. doi: 10.1016/j.arrct.2022.159125. PMID: 36065752; PMCID: [id number].  4. Prerna WASHINGTON, Celina S, Aaron W, Wanda P. AM-PAC Short Forms Manual 4.0. Revised 2/2020.                                                                                                                                                                                                                              Pain Rating:  Right hip 6-7/10     Pain Intervention(s):   patient medicated for pain prior to session, ice, rest, and repositioning    Activity

## 2024-07-26 NOTE — SEDATION DOCUMENTATION
Multiple post-reduction xrays performed. Procedure unsuccessful. This RN continuing to monitor patient until sedation wears off.

## 2024-07-26 NOTE — CONSULTS
Orthopaedic Inpatient Consultation  [facility]    Assessment:   Jayden Thompson is a 73 y.o. year-old male with right prosthetic hip dislocation status post revision at [facility] on 7/11/2024    Plan:   -Weighbearing: NWB RLE  -DVT prophylaxis: SCDs, frequent ambulation, hold chemical anticoagulation for surgical planning  -Pain control: Continue as needed pain management, ice to operative area, elevate  -PT/OT for mobilization postoperatively  -Dispo:  -I discussed with the patient the treatment options.  With 2 failed attempts in the emergency department suspected lack of appropriate sedation, I recommended that we go to the operating room where we can administer general anesthesia and muscle relaxation in a controlled setting.  We talked about the risk, benefits, complications, convalescence regarding this reduction.  Discussed with him that if it is not able to be closed reduced we would have to consider open reduction.  I would likely contact [facility] for transfer if open reduction is required.  All of his questions were answered.  The appropriate surgical site was marked.  -Consent form signed  -NPO  -Pain control as needed    Subjective/History:   Chief Complaint: Right hip pain    Subjective: Jayden Thompson is a 73 y.o. male  evaluated for right hip pain, deformity and inability to bear weight.  He suffered a periprosthetic fracture and was treated operatively very recently [facility] by [facility]. Oliveira on 7/11/24.  He underwent revision total hip arthroplasty of the femoral stem and open reduction internal fixation.  Postoperatively looking through the progress notes he did feel a pop and had displacement of the lesser trochanter not requiring further treatment.  He was leaning over in bed yesterday to pick something up and felt a pop of the right hip he had pain and inability to move the leg.  He was brought to the emergency department here which showed a posterior superior dislocation.  His initial hip replacement  release capsule   Yes Yes   Sig: Take 1 capsule by mouth      Facility-Administered Medications: None        Allergies :   Lisinopril     FAMILY HISTORY :  No family history on file.       Objective:       Vitals:  Patient Vitals for the past 12 hrs:   BP Temp Temp src Pulse Resp SpO2 Height Weight   07/26/24 0645 (!) 149/70 98.1 °F (36.7 °C) Oral 85 16 100 % -- --   07/26/24 0600 -- -- -- 94 -- -- -- --   07/26/24 0400 -- -- -- 85 -- -- -- --   07/26/24 0255 135/69 97.3 °F (36.3 °C) Oral 90 16 97 % -- --   07/26/24 0231 -- -- -- -- 16 -- -- --   07/26/24 0200 -- -- -- 82 -- -- -- --   07/26/24 0157 (!) 159/76 98.8 °F (37.1 °C) Oral 84 16 99 % -- --   07/26/24 0040 (!) 146/73 97.9 °F (36.6 °C) Oral 88 14 97 % -- --   07/26/24 0035 (!) 158/72 -- -- 87 15 97 % -- --   07/25/24 2321 (!) 151/72 -- -- 88 15 98 % -- --   07/25/24 2315 (!) 145/68 -- -- 88 14 96 % -- --   07/25/24 2314 -- -- -- -- -- -- 1.829 m (6') 74.6 kg (164 lb 7.4 oz)   07/25/24 2310 125/67 -- -- 89 18 96 % -- --   07/25/24 2305 (!) 144/66 -- -- 87 15 95 % -- --   07/25/24 2300 123/61 -- -- 84 17 98 % -- --   07/25/24 2255 (!) 123/57 -- -- 85 23 98 % -- --   07/25/24 2250 (!) 143/57 -- -- 85 19 96 % -- --   07/25/24 2245 (!) 142/64 -- -- 97 20 97 % -- --   07/25/24 2240 (!) 160/73 -- -- 86 21 98 % -- --   07/25/24 2235 (!) 177/89 -- -- 88 27 96 % -- --   07/25/24 2230 (!) 147/97 -- -- 91 19 97 % -- --   07/25/24 2227 (!) 168/97 -- -- 93 15 100 % -- --   07/25/24 2227 (!) 168/97 -- -- 93 15 100 % -- --   07/25/24 2200 -- -- -- -- -- -- -- 74.6 kg (164 lb 7.4 oz)   07/25/24 2127 (!) 185/88 -- -- 86 16 98 % -- --   07/25/24 2119 (!) 192/90 98.2 °F (36.8 °C) Oral 85 14 98 % -- --       Eyes with conjugate movement  Alert and Oriented x 3  No Acute Respiratory Distress  Well perfused distally to all extremities with palpable pulses    MSK Physical Exam :     RLE:  Dressing still in place.  No surrounding erythema.  Swelling appropriate for the postoperative

## 2024-07-26 NOTE — H&P
History and Physical    Date of Service:  7/26/2024  Primary Care Provider: No primary care provider on file.  Source of information: The patient and review of EMR    Chief Complaint: Hip Injury      History of Presenting Illness:   Jayden Thompson is a 73 y.o. male with past medical history significant for type 2 diabetes, dyslipidemia, hypothyroidism, anxiety/depression, BPH presented at the emergency room with right hip pain.  Patient is s/p right hip replacement.  He has had dislocation of the hip in the past.  Patient stated that he leaned over in bed and attempted to pick an object up then felt sudden sound in the right hip.  He developed immediate pain in the hip which is constant, sharp, worse with movement, slight relief at rest, 8/10 in severity.  The initial hip was replaced in 2017.  Patient had another fall last week resulting in periprosthetic fracture of the same hip and underwent surgical repair at [facility].  Evaluation in the emergency room revealed dislocation of the prosthesis of the right hip.  Orthopedic Service on-call consulted by ED physician.  Attempt at reduction was not successful in the emergency room.  He was referred to the hospitalist service for admission for surgical intervention in the morning.       REVIEW OF SYSTEMS:  REVIEW OF SYSTEMS:  HEAD, EYES, EARS, NOSE AND THROAT:  No headache.  No dizziness.  No blurring of vision.  No photophobia.  RESPIRATORY SYSTEM:  No  shortness of breath.  No cough.  No hemoptysis.  CARDIOVASCULAR SYSTEM:  No chest pain.  No orthopnea.  No palpitations.  GASTROINTESTINAL SYSTEM:  No nausea or vomiting.  No diarrhea.  No constipation.  GENITOURINARY SYSTEM:  No dysuria, no urgency, and no frequency.     All other systems are reviewed and they are negative.        No past medical history on file.   No past surgical history on file.  Prior to Admission medications    Medication Sig Start Date End Date Taking? Authorizing Provider   venlafaxine

## 2024-07-26 NOTE — FLOWSHEET NOTE
07/26/24 0821   Handoff   Communication Given Shift Handoff   Handoff Given To Julianna MORA   Handoff Received From Griselda ALBERT   Handoff Communication Face to Face   Time Handoff Given 0763   End of Shift Check Performed N/A  (pt in holding for surgery)        29-Jul-2017

## 2024-07-26 NOTE — ANESTHESIA POSTPROCEDURE EVALUATION
Department of Anesthesiology  Postprocedure Note    Patient: Jayden Thompson  MRN: [id number]  YOB: 1950  Date of evaluation: 7/26/2024    Procedure Summary       Date: 07/26/24 Room / Location: [facility] MAIN OR [address] [place] [address] MAIN OR    Anesthesia Start: 0730 Anesthesia Stop: 0822    Procedure: RIGHT HIP CLOSED REDUCTION (Right: Hip) Diagnosis:       Closed dislocation of right hip, initial encounter ([facility])      (Closed dislocation of right hip, initial encounter ([facility]) [S73.004A])    Providers: Andres Armenta MD Responsible Provider: Vidhya Pace DO    Anesthesia Type: MAC, general ASA Status: 3            Anesthesia Type: No value filed.    Jacqueline Phase I: Jacqueline Score: 9    Jacqueline Phase II:      Anesthesia Post Evaluation    Patient location during evaluation: PACU  Level of consciousness: awake  Airway patency: patent  Nausea & Vomiting: no nausea  Cardiovascular status: hemodynamically stable  Respiratory status: acceptable  Hydration status: stable  Multimodal analgesia pain management approach  Pain management: adequate    No notable events documented.

## 2024-07-26 NOTE — CONSULTS
ORTHOPEDIC SURGERY CONSULT    Subjective:     Date of Consultation:  July 25, 2024    Jayden Thompson is a 73 y.o. male who is being seen for right hip dislocation. He reports he leaned over in bed attempting to pick something up and felt a \"pop\" in the right hip. He had immediate pain and was unable to move the leg. Xrays in the ER at [place] show superior dislocation of the right femoral prosthesis in relation to the acetabulum. His right hip was replaced in 2017 and he had done well up until suffering a fall last week causing a flora-prosthetic proximal femur fracture. Injury occurred at [facility] just prior to arrival. He was there recovering from ORIF of the right hip performed at [facility] last week. Orthopedic surgery was consulted by Dr. Delcid for evaluation and reduction.    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   Social History     Tobacco Use    Smoking status: Not on file    Smokeless tobacco: Not on file   Substance Use Topics    Alcohol use: Not on file     No past medical history on file.   No past surgical history on file.   Prior to Admission medications    Not on File     Current Facility-Administered Medications   Medication Dose Route Frequency    propofol bolus 40 mg  40 mg IntraVENous PRN     No current outpatient medications on file.      Allergies   Allergen Reactions    Lisinopril Cough        Review of Systems:  A comprehensive review of systems was negative except for that written in the HPI.    Mental Status: no dementia    Objective:     Patient Vitals for the past 8 hrs:   BP Temp Temp src Pulse Resp SpO2 Height Weight   07/25/24 2315 (!) 145/68 -- -- 88 14 96 % -- --   07/25/24 2314 -- -- -- -- -- -- 1.829 m (6') 74.6 kg (164 lb 7.4 oz)   07/25/24 2310 125/67 -- -- 89 18 96 % -- --   07/25/24 2305 (!) 144/66 -- -- 87 15 95 % -- --   07/25/24 2300 123/61 -- -- 84 17 98 % -- --

## 2024-07-26 NOTE — SEDATION DOCUMENTATION
50 mg propofol given by Bhavin NAVARRO  Total of 170 mg of propofol   Brittani Lee is a 17 y.o. female here for a non-provider visit for PPD reading -- Step 1 of 1.      1.  Resulted in Epic under enter/edit results? Yes   2.  TB evaluation questionnaire scanned into chart and original given to patient?Yes      3. Was induration greater than 0 mm? No.        Routed to PCP? Yes

## 2024-07-26 NOTE — PROGRESS NOTES
End of Shift Note    Bedside shift change report given to Jose ALBERT (oncoming nurse) by Julianna Martell LPN (offgoing nurse).  Report included the following information SBAR    Shift worked:  [id number]     Shift summary and any significant changes:    Room air  Postop  PT  Tolerated diet  Tolerated medication  Pain management  Assist x2   Comments:  Patient ambulated 20 ft and returned to bed post op. Bed in lowest position, alarm activated, and call button within reach       Activity:     Number times ambulated in hallways past shift: 0  Number of times OOB to chair past shift: 0    Cardiac:   Cardiac Monitoring: Yes           Access:  Current line(s): PIV     Genitourinary:   Urinary status: voiding and external catheter    Respiratory:      Chronic home O2 use?: NO  Incentive spirometer at bedside: YES       GI:     Current diet:  ADULT DIET; Regular  Passing flatus: YES  Tolerating current diet: YES       Pain Management:   Patient states pain is manageable on current regimen: YES    Skin:     Interventions: turn team, float heels, increase time out of bed, PT/OT consult, limit briefs, internal/external urinary devices, and nutritional support    Patient Safety:  Fall Score:    Interventions: bed/chair alarm, assistive device (walker, cane. etc), gripper socks, pt to call before getting OOB, and gait belt       Length of Stay:  Expected LOS: 4  Actual LOS: 1      Julianna Martell LPN

## 2024-07-26 NOTE — PROCEDURES
Hip Reduction Procedural Note      Preprocedural Diagnosis: Dislocation right hip prothesis  Postprocedural Diagnosis: Dislocation right hip prothesis    Provider: LUIS Zepeda     Anesthesia: Procedural sedation administered by Dr. Delcid    Allergy:   Allergies   Allergen Reactions    Lisinopril Cough       Procedure Details:  The risks,benefits and alternatives of a closed joint reduction were explained and consent was obtained for the procedure. Closed reduction of right hip joint using traction and countertraction and standard hip reduction techniques. Despite multiple doses of sedation and multiple reduction attempts we were unable to reduce the hip. Persistent dislocation of the right hip joint confirmed with Xray. Patient tolerated procedure well w/o complications. Neurovascularly intact with sensation intact and capillary refill <2secs p procedure in right foot  Pt. Awake and alert.         Complications:  None; patient tolerated the procedure well.    Discussed with attending orthopedic surgeon, Dr. Armenta, and discussed with patient. Ordered CT of the hip to further evaluate. Will need admission to medicine overnight and closed reduction in OR tomorrow. Case posted with OR. NPO after midnight.       Signed By: LUIS Zepeda  Orthopedic Trauma Team  [facility] [place]  7/25/24  11:16pm

## 2024-07-26 NOTE — PROGRESS NOTES
Hospitalist Progress Note  CHEIKH De La Cruz NP  Answering service: [phone]        Date of Service:  2024  NAME:  Jayden Thompson  :  1950  MRN:  [id number]    Admission Summary:     Mr. Thompson is a 73 y.o. male with past medical history significant for type 2 diabetes, dyslipidemia, hypothyroidism, anxiety/depression, BPH who presented to the ED with right hip pain - has a hx of right hip replacement () and has had a recent admission to [facility] on 7/10/2024 after a ground level fall which resulted in a right hip periprosthetic femur fracture. He was taken to the OR on 2024 and underwent a total revision of R hip and post - op was admitted to [facility].  Patient reported that he had leaned over in bed and attempted to pick an object up when he felt a sudden\"sound\" in the right hip followed by immediate pain 8/10 in severity.      He was admitted for further eval and treatment.    Interval history / Subjective:        Patient was seen and examined this morning, he was lying in bed in no acute distress though reports he is having some right hip discomfort.  In speaking with the patient, he reported that he lived in a group home but was not completely familiar with the area and had difficulty elaborating.    On chart review, it appears as though patient has recently been at [facility], then discharged to [place] for skilled nursing.    Assessment & Plan:     Dislocation of prosthesis of right hip POA  - POD 0 Right hip closed reduction   - pain mgmt   - lovenox for DVT ppx  - PT/OT  - to go back to SNF when able    CKD, stage 3  - Cr 1.7 on 7/15 d/c paperwork from [facility]  - gentle fluids     Type 2 diabetes   - A1c 5.6  - glucose range 128-136  - continue with SSI prn     Dyslipidemia   - continue Lipitor.     Acquired hypothyroidism  - TSH 1.53  - continue synthroid     Anxiety/depression   - continue

## 2024-07-26 NOTE — ED NOTES
hip.    SAFETY    Mobility: limited bed mobility , limited transfer mobility , and a recent fall   ED Fall Risk: Presents to emergency department  because of falls (Syncope, seizure, or loss of consciousness): Yes, Age > 70: Yes, Altered Mental Status, Intoxication with alcohol or substance confusion (Disorientation, impaired judgment, poor safety awaremess, or inability to follow instructions): No, Impaired Mobility: Ambulates or transfers with assistive devices or assistance; Unable to ambulate or transer.: No, Nursing Judgement: Yes   Restraints no   Sitter no     Background  History: No past medical history on file.    Assessment    Vitals/MEWS: MEWS Score: 0  Level of Consciousness: Alert (0)   Vitals:    07/25/24 2315 07/25/24 2321 07/26/24 0035 07/26/24 0040   BP: (!) 145/68 (!) 151/72 (!) 158/72 (!) 146/73   Pulse: 88 88 87 88   Resp: 14 15 15 14   Temp:    97.9 °F (36.6 °C)   TempSrc:    Oral   SpO2: 96% 98% 97% 97%   Weight:       Height:         DI:   Predictive Model Details          26 (Normal)  Factor Value    Calculated 7/26/2024 00:46 48% Age 73 years old    Deterioration Index Model 15% Respiratory rate 14     9% WBC count abnormal (11.2 K/uL)     8% Hematocrit abnormal (26.5 %)     8% Systolic 146     5% Sodium 136 mmol/L     4% BUN abnormal (23 MG/DL)     2% Pulse 88     1% Potassium 3.8 mmol/L     0% Pulse oximetry 97 %     0% Temperature 97.9 °F (36.6 °C)       FiO2 (%): None  O2 Flow Rate: O2 Device: None (Room air) O2 Flow Rate (L/min): 2 L/min  Cardiac Rhythm: Cardiac Rhythm: Sinus rhythm  Pain Scale: Pain Assessment  Pain Assessment: None - Denies Pain  Pain Level: 10  Patient's Stated Pain Goal: 1  Pain Location: Hip  Pain Orientation: Right  Pain Descriptors: Sharp  Functional Pain Assessment: Prevents or interferes with all active and some passive activities  Pain Type: Acute pain  Pain Frequency: Continuous  Pain Onset: Sudden  Non-Pharmaceutical Pain Intervention(s): Rest  Last  documented pain score (0-10 scale) Pain Level: 10  Last documented pain medication administered: Dilaudid @ 2204, propofol @ 2252     Mental Status: oriented and alert  Orientation Level:    NIH Score:    C-SSRS: Risk of Suicide: No Risk  Bedside swallow:    [place] Coma Scale (GCS): Peyton Coma Scale  Eye Opening: Spontaneous  Best Verbal Response: Oriented  Best Motor Response: Obeys commands  Peyton Coma Scale Score: 15  Active LDA's:   Peripheral IV 07/25/24 Right Forearm (Active)   Site Assessment Clean, dry & intact 07/25/24 2121   Line Status Blood return noted 07/25/24 2121   Line Care Cap changed 07/25/24 2121   Phlebitis Assessment No symptoms 07/25/24 2121   Infiltration Assessment 0 07/25/24 2121     PO Status: Nothing by Mouth  Pertinent or High Risk Medications/Drips: no   If Yes, please provide details: None  Titratable drips: no   Pending Blood Product Administration: no     Sepsis    Sepsis Risk Score   Predictive Model Details          14 (Low)  Factor Value    Calculated 7/26/2024 00:36 14% O2 Delivery Method ROOM AIR    [facility] EARLY DETECTION OF SEPSIS VERSION 2 Model 8% Age 73 years old     7% Pain Scale 10     6% Albumin 3.2 g/dL     6% Platelet Count 377 K/uL     -5% Total Active Inpatient Meds 1     5% Creatinine 1.96 MG/DL     4% Has Imaging Procedure 1     3% RBC Count 3.03 M/uL     -3% Total Active Outpatient Meds 19      Recent Labs     07/25/24 2120   WBC 11.2*     Blood Cultures Drawn: No  None am  Repeat LA: Time Due none  Antibiotic Given: No  Fluid Resuscitation: Total needed N/A, Status other N/A    VS x 2 post-fluid resuscitation: N/A    Recommendation    Pending orders none  Consults ordered: IP CONSULT TO HOSPITALIST    Consulted provider:      Plan for next 24 hours: surgery later today for reduction of R hip  Additional Comments: patient requesting more pain medications, MD aware.     If any further questions, please call Sending RN at [phone]  Electronically signed by:

## 2024-07-26 NOTE — ED PROVIDER NOTES
[facility] EMERGENCY DEP  EMERGENCY DEPARTMENT ENCOUNTER      Pt Name: Jayden Thompson  MRN: [id number]  Birthdate 1950  Date of evaluation: 7/25/2024  Provider: Do Delcid MD    CHIEF COMPLAINT       Chief Complaint   Patient presents with    Hip Injury         HISTORY OF PRESENT ILLNESS    Jayden Thompson is a 74 yo M with h/o type 2 DM, CKD, bipolar disorder with. right hip replacement in 2017 who states he was admitted earlier this month to [facility] for repair after it was injured in a fall.  Today he was leaning forward to  something on the floor and felt his hip pop out.                    Additional history from independent historians:     Review of External Medical Records:     Nursing Notes were reviewed.    REVIEW OF SYSTEMS       Review of Systems   Musculoskeletal:         Right hip pain       Except as noted above the remainder of the review of systems was reviewed and negative.       PAST MEDICAL HISTORY   No past medical history on file.      SURGICAL HISTORY     No past surgical history on file.      CURRENT MEDICATIONS       Previous Medications    No medications on file       ALLERGIES     Lisinopril    FAMILY HISTORY     No family history on file.       SOCIAL HISTORY            PHYSICAL EXAM       ED Triage Vitals [07/25/24 2119]   BP Temp Temp Source Pulse Respirations SpO2 Height Weight   (!) 192/90 98.2 °F (36.8 °C) Oral 85 14 98 % -- --          There is no height or weight on file to calculate BMI.    Physical Exam  Vitals and nursing note reviewed.   Constitutional:       General: He is not in acute distress.  HENT:      Head: Normocephalic and atraumatic.      Mouth/Throat:      Mouth: Mucous membranes are moist.   Eyes:      Extraocular Movements: Extraocular movements intact.      Conjunctiva/sclera: Conjunctivae normal.   Cardiovascular:      Rate and Rhythm: Normal rate.   Pulmonary:      Effort: Pulmonary effort is normal. No respiratory distress.   Abdominal:      General:

## 2024-07-26 NOTE — OP NOTE
Operative Report    Patient Name: Jayden Thompson    Patient YOB: 1950    Patient's Hospital MRN: [id number]    Date of Procedure: 07/26/24    Surgical Location: [facility]    Pre-operative Diagnosis: Right prosthetic hip dislocation    Post-operative Diagnosis: Right prosthetic hip dislocation    Procedure: Right hip closed reduction under general anesthesia in the operating room    Surgeon: Andres Armenta MD    Assistant: surgical staff    Anesthesia: General    Preoperative IV Antibiotics: None required    Estimated Blood Loss: 0mL    Drains: None    Findings: Fracture reduced, confirmed on fluoroscopy    Specimens: None    Complications: None    Disposition: to Pacu - hemodynamically stable    Condition: stable      Indications for procedure:  Mr. Thompson is a 73-year-old male who recently underwent revision total hip arthroplasty with open reduction internal fixation for a periprosthetic fracture at [facility] by Dr. Benitez.  He originally underwent total hip arthroplasty in 2017 in [place].  During his hospitalization he was noted to have a pop and increased pain and x-ray showed a displaced lesser trochanter fracture.  He was discharged.  Yesterday he was leaning over his bed when he felt a pop.  He had pain and inability to bear weight.  He is brought to the emergency department where he was found to have a prosthetic dislocation.  Sedation was attempted in the ER twice for reduction but was unsuccessful.  He was therefore indicated that he brought to the operating room under general anesthesia.    Description of procedure:  The patient was met in the preoperative holding area. The appropriate surgical site was marked. The patient signed a consent form to proceed with surgery. The patient was brought into the operating room and given anesthesia.  The patient was placed on an operating room table. A multidisciplinary timeout was performed. IV antibiotics were dosed.      After obtaining

## 2024-07-26 NOTE — PROGRESS NOTES
Dual skin assessment completed with Sigrid ALBERT. Sacrum and heels intact. Right hip with dressing present ([facility] performed ORIF last week). Deep brain stimulators implanted bilaterally on chest.

## 2024-07-27 LAB
ANION GAP SERPL CALC-SCNC: 6 MMOL/L (ref 5–15)
BUN SERPL-MCNC: 23 MG/DL (ref 6–20)
BUN/CREAT SERPL: 14 (ref 12–20)
CALCIUM SERPL-MCNC: 8.3 MG/DL (ref 8.5–10.1)
CHLORIDE SERPL-SCNC: 107 MMOL/L (ref 97–108)
CO2 SERPL-SCNC: 27 MMOL/L (ref 21–32)
CREAT SERPL-MCNC: 1.67 MG/DL (ref 0.7–1.3)
GLUCOSE BLD STRIP.AUTO-MCNC: 116 MG/DL (ref 65–117)
GLUCOSE BLD STRIP.AUTO-MCNC: 132 MG/DL (ref 65–117)
GLUCOSE BLD STRIP.AUTO-MCNC: 155 MG/DL (ref 65–117)
GLUCOSE BLD STRIP.AUTO-MCNC: 170 MG/DL (ref 65–117)
GLUCOSE SERPL-MCNC: 114 MG/DL (ref 65–100)
HCT VFR BLD AUTO: 24.5 % (ref 36.6–50.3)
HGB BLD-MCNC: 7.5 G/DL (ref 12.1–17)
POTASSIUM SERPL-SCNC: 4.2 MMOL/L (ref 3.5–5.1)
SERVICE CMNT-IMP: ABNORMAL
SERVICE CMNT-IMP: NORMAL
SODIUM SERPL-SCNC: 140 MMOL/L (ref 136–145)

## 2024-07-27 PROCEDURE — 6370000000 HC RX 637 (ALT 250 FOR IP): Performed by: HOSPITALIST

## 2024-07-27 PROCEDURE — 1100000000 HC RM PRIVATE

## 2024-07-27 PROCEDURE — 6370000000 HC RX 637 (ALT 250 FOR IP): Performed by: INTERNAL MEDICINE

## 2024-07-27 PROCEDURE — 2580000003 HC RX 258: Performed by: INTERNAL MEDICINE

## 2024-07-27 PROCEDURE — 85018 HEMOGLOBIN: CPT

## 2024-07-27 PROCEDURE — 97535 SELF CARE MNGMENT TRAINING: CPT

## 2024-07-27 PROCEDURE — 85014 HEMATOCRIT: CPT

## 2024-07-27 PROCEDURE — 36415 COLL VENOUS BLD VENIPUNCTURE: CPT

## 2024-07-27 PROCEDURE — 80048 BASIC METABOLIC PNL TOTAL CA: CPT

## 2024-07-27 PROCEDURE — 82962 GLUCOSE BLOOD TEST: CPT

## 2024-07-27 PROCEDURE — 97165 OT EVAL LOW COMPLEX 30 MIN: CPT

## 2024-07-27 PROCEDURE — 97530 THERAPEUTIC ACTIVITIES: CPT

## 2024-07-27 PROCEDURE — 6360000002 HC RX W HCPCS: Performed by: ORTHOPAEDIC SURGERY

## 2024-07-27 PROCEDURE — 97116 GAIT TRAINING THERAPY: CPT

## 2024-07-27 RX ORDER — FERROUS SULFATE 325(65) MG
325 TABLET ORAL
Status: DISCONTINUED | OUTPATIENT
Start: 2024-07-27 | End: 2024-08-01 | Stop reason: HOSPADM

## 2024-07-27 RX ORDER — LANOLIN ALCOHOL/MO/W.PET/CERES
3 CREAM (GRAM) TOPICAL NIGHTLY PRN
Status: DISCONTINUED | OUTPATIENT
Start: 2024-07-27 | End: 2024-08-01 | Stop reason: HOSPADM

## 2024-07-27 RX ADMIN — PANTOPRAZOLE SODIUM 40 MG: 40 TABLET, DELAYED RELEASE ORAL at 07:22

## 2024-07-27 RX ADMIN — SERTRALINE HYDROCHLORIDE 200 MG: 50 TABLET ORAL at 10:17

## 2024-07-27 RX ADMIN — OXYCODONE 5 MG: 5 TABLET ORAL at 23:10

## 2024-07-27 RX ADMIN — OXYCODONE 5 MG: 5 TABLET ORAL at 07:22

## 2024-07-27 RX ADMIN — ASPIRIN 81 MG CHEWABLE TABLET 81 MG: 81 TABLET CHEWABLE at 10:16

## 2024-07-27 RX ADMIN — LEVOTHYROXINE SODIUM 50 MCG: 0.05 TABLET ORAL at 07:22

## 2024-07-27 RX ADMIN — FERROUS SULFATE TAB 325 MG (65 MG ELEMENTAL FE) 325 MG: 325 (65 FE) TAB at 10:28

## 2024-07-27 RX ADMIN — BUPROPION HYDROCHLORIDE 150 MG: 150 TABLET, EXTENDED RELEASE ORAL at 10:16

## 2024-07-27 RX ADMIN — OXYCODONE 5 MG: 5 TABLET ORAL at 10:15

## 2024-07-27 RX ADMIN — FINASTERIDE 5 MG: 5 TABLET, FILM COATED ORAL at 10:18

## 2024-07-27 RX ADMIN — LURASIDONE HYDROCHLORIDE 80 MG: 80 TABLET ORAL at 10:27

## 2024-07-27 RX ADMIN — ENOXAPARIN SODIUM 40 MG: 100 INJECTION SUBCUTANEOUS at 10:18

## 2024-07-27 RX ADMIN — ATORVASTATIN CALCIUM 80 MG: 40 TABLET, FILM COATED ORAL at 19:52

## 2024-07-27 RX ADMIN — SODIUM CHLORIDE, PRESERVATIVE FREE 10 ML: 5 INJECTION INTRAVENOUS at 10:20

## 2024-07-27 RX ADMIN — OXYCODONE 5 MG: 5 TABLET ORAL at 19:01

## 2024-07-27 RX ADMIN — FENOFIBRATE 54 MG: 54 TABLET ORAL at 10:17

## 2024-07-27 ASSESSMENT — PAIN DESCRIPTION - ORIENTATION
ORIENTATION: RIGHT

## 2024-07-27 ASSESSMENT — PAIN SCALES - GENERAL
PAINLEVEL_OUTOF10: 6
PAINLEVEL_OUTOF10: 6
PAINLEVEL_OUTOF10: 2
PAINLEVEL_OUTOF10: 6
PAINLEVEL_OUTOF10: 3
PAINLEVEL_OUTOF10: 4
PAINLEVEL_OUTOF10: 6

## 2024-07-27 ASSESSMENT — PAIN DESCRIPTION - DESCRIPTORS
DESCRIPTORS: ACHING
DESCRIPTORS: DISCOMFORT;DULL
DESCRIPTORS: ACHING;DULL
DESCRIPTORS: ACHING

## 2024-07-27 ASSESSMENT — PAIN DESCRIPTION - LOCATION
LOCATION: HIP

## 2024-07-27 ASSESSMENT — PAIN - FUNCTIONAL ASSESSMENT
PAIN_FUNCTIONAL_ASSESSMENT: ACTIVITIES ARE NOT PREVENTED
PAIN_FUNCTIONAL_ASSESSMENT: ACTIVITIES ARE NOT PREVENTED

## 2024-07-27 NOTE — PLAN OF CARE
Problem: Physical Therapy - Adult  Goal: By Discharge: Performs mobility at highest level of function for planned discharge setting.  See evaluation for individualized goals.  Description: FUNCTIONAL STATUS PRIOR TO ADMISSION: Patient was modified independent using a rolling walker for functional mobility.  Pt poor historian.  Per case management - pt transferred from [place].    HOME SUPPORT PRIOR TO ADMISSION: Per pt - pt lived in group home.    Physical Therapy Goals  Initiated 7/26/2024  1.  Patient will move from supine to sit and sit to supine and scoot up and down in bed with modified independence within 7 day(s).    2.  Patient will perform sit to stand with modified independence within 7 day(s).  3.  Patient will transfer from bed to chair and chair to bed with modified independence using the least restrictive device within 7 day(s).  4.  Patient will ambulate with supervision for 125 feet with the least restrictive device within 7 day(s).   5.  Patient will ascend/descend 4 stairs with one handrail(s) with supervision within 7 day(s).  6.  Patient will perform THR home exercise program per protocol with supervision/set-up within 7 days.  7. Patient will verbalize and demonstrate understanding of posterior hip precautions per protocol within 4 days.     Outcome: Progressing   PHYSICAL THERAPY TREATMENT    Patient: Jayden Thompson (73 y.o. male)  Date: 7/27/2024  Diagnosis: Dislocation of right hip, initial encounter ([facility] located within [facility]) [S73.004A]  Dislocation of prosthesis of right hip joint ([facility] located within [facility]) [T84.020A] Dislocation of prosthesis of right hip joint ([facility] located [facility])  Procedure(s) (LRB):  RIGHT HIP CLOSED REDUCTION (Right) 1 Day Post-Op  Precautions: Fall Risk, Weight Bearing Right Lower Extremity Weight Bearing: Weight Bearing As Tolerated           Hip Precautions: Posterior hip precautions, No ADduction, No hip internal rotation, No hip flexion > 90 degrees        ASSESSMENT:  Patient continues to benefit from skilled PT  Static: Good (unsupported)  Sitting - Dynamic: Fair (occasional)  Standing: Impaired  Standing - Static: Constant support;Fair  Standing - Dynamic: Constant support;Fair   Ambulation/Gait Training:     Gait  Gait Training: Yes  Right Side Weight Bearing: As tolerated  Overall Level of Assistance: Minimum assistance  Distance (ft): 35 Feet  Assistive Device: Walker, rolling;Gait belt;Brace/splint  Interventions: Verbal cues;Safety awareness training  Base of Support: Shift to left;Center of gravity altered  Speed/Uzma: Slow  Step Length: Left shortened;Right shortened  Gait Abnormalities: Antalgic;Decreased step clearance        Neuro Re-Education:                    Pain Ratin/10   Pain Intervention(s):   nursing notified and addressing, rest, and elevation    Activity Tolerance:   Fair , requires frequent rest breaks, and observed shortness of breath on exertion    After treatment:   Patient left in no apparent distress in bed and Call bell within reach      COMMUNICATION/EDUCATION:   The patient's plan of care was discussed with: registered nurse    Patient Education  Education Given To: Patient  Education Provided: Role of Therapy;Plan of Care;Transfer Training;Fall Prevention Strategies  Education Method: Demonstration;Verbal  Barriers to Learning: None  Education Outcome: Verbalized understanding;Demonstrated understanding      MORA MERRILL, TYLER  Minutes: 26

## 2024-07-27 NOTE — DISCHARGE INSTRUCTIONS
Discharge SNF/Rehab Instructions/LTAC       PATIENT ID: Jayden Thompson  MRN: [id number]   YOB: 1950    DATE OF ADMISSION: 7/25/2024  9:13 PM    DATE OF DISCHARGE: 8/1/2024    PRIMARY CARE PROVIDER: No primary care provider on file.       ATTENDING PHYSICIAN: Lucinda Parker MD  DISCHARGING PROVIDER: David M Milligram, PA-C     To contact this individual call [phone] and ask the  to page.   If unavailable ask to be transferred the Adult Hospitalist Department.    CONSULTATIONS: IP CONSULT TO HOSPITALIST  IP CONSULT TO CASE MANAGEMENT    PROCEDURES/SURGERIES: Procedure(s):  RIGHT HIP CLOSED REDUCTION    ADMITTING DIAGNOSES & HOSPITAL COURSE:   Mr. Thompson is a 73 y.o. male with past medical history significant for type 2 diabetes, dyslipidemia, hypothyroidism, anxiety/depression, BPH who presented to the ED with right hip pain - has a hx of right hip replacement (2017) and has had a recent admission to [facility] on 7/10/2024 after a ground level fall which resulted in a right hip periprosthetic femur fracture. He was taken to the OR on 7/11/2024 and underwent a total revision of R hip and post - op was admitted to [facility]. Patient reported that he had leaned over in bed and attempted to pick an object up when he felt a sudden\"sound\" in the right hip followed by immediate pain 8/10 in severity      DISCHARGE DIAGNOSES / PLAN:       Dislocation of prosthesis of right hip POA 7/26  - underwent right hip closed reduction under general anesthesia in OR 7/26  - continue pain mgmt   - lovenox for DVT ppx  - PT/OT following  - Ortho following: WBAT RLE, strict posterior hip precautions, use knee immobilizer when ambulating, use abduction pillow between legs when in bed  - will need outpatient follow up with Dr. Oliveira ([facility])     Anxiety/depression   - continue Wellbutrin    Dyslipidemia   - continue Lipitor.     CKD, stage 3  - Cr 1.7 on 7/15 d/c paperwork from [facility] -- creatinine 1.71  -  follow labs     Type 2 diabetes   - A1c 5.6  - Stopping metformin at discharge due to GFR < 45. Needs PCP follow up     Acquired hypothyroidism  - TSH 1.53  - continue synthroid       PENDING TEST RESULTS:   At the time of discharge the following test results are still pending: None    FOLLOW UP APPOINTMENTS:    [unfilled]     ADDITIONAL CARE RECOMMENDATIONS:   Follow up with [facility]  Follow up with your PCP for further management of chronic conditions    DIET: regular diet     ACTIVITY: activity as tolerated     WOUND CARE: N/A     EQUIPMENT needed: N/A      DISCHARGE MEDICATIONS:   See Medication Reconciliation Form      NOTIFY YOUR PHYSICIAN FOR ANY OF THE FOLLOWING:   Fever over 101 degrees for 24 hours.   Chest pain, shortness of breath, fever, chills, nausea, vomiting, diarrhea, change in mentation, falling, weakness, bleeding. Severe pain or pain not relieved by medications.  Or, any other signs or symptoms that you may have questions about.    DISPOSITION:    Home With:   OT  PT  HH  RN      x SNF/Inpatient Rehab/LTAC    Independent/assisted living    Hospice    Other:       PATIENT CONDITION AT DISCHARGE:     Functional status    Poor    x Deconditioned     Independent      Cognition    x Lucid     Forgetful     Dementia      Catheters/lines (plus indication)    Mazariegos     PICC     PEG    x None      Code status    x Full code     DNR      PHYSICAL EXAMINATION AT DISCHARGE:   Refer to Progress Note 8/1/2024      CHRONIC MEDICAL DIAGNOSES:        CDMP Checked:   Yes x     PROBLEM LIST Updated:  Yes x         Signed:   David M Milligram, PA-C  8/1/2024  8:06 AM

## 2024-07-27 NOTE — PLAN OF CARE
Problem: Discharge Planning  Goal: Discharge to home or other facility with appropriate resources  Outcome: Progressing     Problem: Skin/Tissue Integrity  Goal: Absence of new skin breakdown  Description: 1.  Monitor for areas of redness and/or skin breakdown  2.  Assess vascular access sites hourly  3.  Every 4-6 hours minimum:  Change oxygen saturation probe site  4.  Every 4-6 hours:  If on nasal continuous positive airway pressure, respiratory therapy assess nares and determine need for appliance change or resting period.  7/27/2024 1448 by Jerica Fairchild LPN  Outcome: Progressing  7/27/2024 0119 by Jose Dumont RN  Outcome: Progressing     Problem: Safety - Adult  Goal: Free from fall injury  7/27/2024 1448 by Jerica Fairchild LPN  Outcome: Progressing  7/27/2024 0119 by Jose Dumont RN  Outcome: Progressing     Problem: Pain  Goal: Verbalizes/displays adequate comfort level or baseline comfort level  7/27/2024 1448 by Jerica Fairchild LPN  Outcome: Progressing  7/27/2024 0119 by Jose Dumont RN  Outcome: Progressing     Problem: Physical Therapy - Adult  Goal: By Discharge: Performs mobility at highest level of function for planned discharge setting.  See evaluation for individualized goals.  Description: FUNCTIONAL STATUS PRIOR TO ADMISSION: Patient was modified independent using a rolling walker for functional mobility.  Pt poor historian.  Per case management - pt transferred from [place].    HOME SUPPORT PRIOR TO ADMISSION: Per pt - pt lived in group home.    Physical Therapy Goals  Initiated 7/26/2024  1.  Patient will move from supine to sit and sit to supine and scoot up and down in bed with modified independence within 7 day(s).    2.  Patient will perform sit to stand with modified independence within 7 day(s).  3.  Patient will transfer from bed to chair and chair to bed with modified independence using the least restrictive device within 7 day(s).  4.  Patient will  ambulate with supervision for 125 feet with the least restrictive device within 7 day(s).   5.  Patient will ascend/descend 4 stairs with one handrail(s) with supervision within 7 day(s).  6.  Patient will perform THR home exercise program per protocol with supervision/set-up within 7 days.  7. Patient will verbalize and demonstrate understanding of posterior hip precautions per protocol within 4 days.     7/27/2024 1307 by Adriel Roach, PT  Outcome: Progressing     Problem: Occupational Therapy - Adult  Goal: By Discharge: Performs self-care activities at highest level of function for planned discharge setting.  See evaluation for individualized goals.  Description: FUNCTIONAL STATUS PRIOR TO ADMISSION:  Patient with R AUBREE 2017 with recent dislocation 7/11/2024 requiring surgery at [facility] and then discharged to SNF. Admitted from SNF after dislocation at SNF. Prior to most recent surgeries and [facility] patient overall Mod I using a cane for mobility.    , ADL Assistance: Independent,  ,  ,  ,  ,  , Homemaking Assistance: Independent, Ambulation Assistance: Independent, Transfer Assistance: Independent, Active : No         HOME SUPPORT: Lives in group home, unclear full level of assist available     Occupational Therapy Goals  Initiated 7/27/2024    1. Patient will perform lower body dressing with AE PRN with Minimal Assist within 7 day(s).  2. Patient will perform upper body ADLS standing for 5 minutes without fatigue or LOB with Contact Guard Assist within 7 days.  3. Patient will perform toilet transfers with Moderate Assist using Bedside Commode Over Toilet within 7 days.  4. Patient will perform all aspects of toileting at Moderate Assist within 7 days.  5. Patient will utilize energy conservation techniques during functional activities without cues within 7 day(s).  6. Patient will adhere to posterior hip precautions without cues within 7 days.       7/27/2024 1027 by Maine Daniels, OT  Outcome:

## 2024-07-27 NOTE — DISCHARGE SUMMARY
Discharge Summary       PATIENT ID: Jayden Thompson  MRN: [id number]   YOB: 1950    DATE OF ADMISSION: 7/25/2024  9:13 PM    DATE OF DISCHARGE: 7/2//24   PRIMARY CARE PROVIDER: No primary care provider on file.     ATTENDING PHYSICIAN: Dr. Noel Pettit  DISCHARGING PROVIDER: CHEIKH Feliciano CNP    To contact this individual call [phone] and ask the  to page.  If unavailable ask to be transferred the Adult Hospitalist Department.    CONSULTATIONS: IP CONSULT TO HOSPITALIST  IP CONSULT TO CASE MANAGEMENT    PROCEDURES/SURGERIES: Procedure(s):  RIGHT HIP CLOSED REDUCTION     ADMITTING DIAGNOSES & HOSPITAL COURSE:     Mr. Thompson is a 73 y.o. male with past medical history significant for type 2 diabetes, dyslipidemia, hypothyroidism, anxiety/depression, BPH who presented to the ED with right hip pain - has a hx of right hip replacement (2017) and has had a recent admission to [facility] on 7/10/2024 after a ground level fall which resulted in a right hip periprosthetic femur fracture. He was taken to the OR on 7/11/2024 and underwent a total revision of R hip and post - op was admitted to [facility].  Patient reported that he had leaned over in bed and attempted to pick an object up when he felt a sudden\"sound\" in the right hip followed by immediate pain 8/10 in severity.       Patient admitted and dislocation was reduced in OR.  POD 1 pain is under control and patient able to go back to SNF from whence he came    SNF called and stated authorization needs to be resubmitted for readmission    DISCHARGE DIAGNOSES / PLAN:       Dislocation of prosthesis right hip POA, multimodal pain management, PT/OT  CKD stage 3, avoid nephrotoxic medications  DM 2 continue current regimen  HLD continue statin  Hypothyroidism, continue levothyroxine  Anxiety/Depression continue wellbutrin  BPH continue proscar       PENDING TEST RESULTS:   At the time of discharge the following test results are still  pending: none    FOLLOW UP APPOINTMENTS:    Follow-up Information       Follow up With Specialties Details Why Contact Info    Edgardo Benitez MD Orthopaedic Surgery Follow up as previously scheduled [address]  [address]  [facility] [address]  [phone]                ADDITIONAL CARE RECOMMENDATIONS: none    DIET: regular diet  Oral Nutritional Supplements: Ensure High Proonce a day    ACTIVITY: wbat tolerated, posterior hip precautions, abduction wedge on in bed and knee immobilizer on when walking    WOUND CARE: keep wound clean and dry    EQUIPMENT needed: none      DISCHARGE MEDICATIONS:     Medication List        CONTINUE taking these medications      albuterol sulfate  (90 Base) MCG/ACT inhaler  Commonly known as: PROVENTIL;VENTOLIN;PROAIR     aspirin 81 MG chewable tablet     atorvastatin 80 MG tablet  Commonly known as: LIPITOR     buPROPion 150 MG extended release tablet  Commonly known as: WELLBUTRIN XL     docusate 100 MG Caps  Commonly known as: COLACE, DULCOLAX     fenofibrate 48 MG tablet  Commonly known as: TRICOR     ferrous sulfate 325 (65 Fe) MG EC tablet  Commonly known as: FE TABS 325     finasteride 5 MG tablet  Commonly known as: PROSCAR     lurasidone 80 MG Tabs tablet  Commonly known as: LATUDA     Melatonin 10 MG Tabs     metFORMIN 1000 MG tablet  Commonly known as: GLUCOPHAGE     oxyCODONE 5 MG immediate release tablet  Commonly known as: ROXICODONE     pantoprazole 40 MG tablet  Commonly known as: PROTONIX     propranolol 20 MG tablet  Commonly known as: INDERAL     sertraline 100 MG tablet  Commonly known as: ZOLOFT     Synthroid 50 MCG tablet  Generic drug: levothyroxine     tiotropium-olodaterol 2.5-2.5 MCG/ACT Aers  Commonly known as: STIOLTO     topiramate 50 MG tablet  Commonly known as: TOPAMAX            STOP taking these medications      venlafaxine 75 MG extended release capsule  Commonly known as: EFFEXOR XR                NOTIFY YOUR PHYSICIAN FOR ANY OF THE

## 2024-07-27 NOTE — PLAN OF CARE
Problem: Occupational Therapy - Adult  Goal: By Discharge: Performs self-care activities at highest level of function for planned discharge setting.  See evaluation for individualized goals.  Description: FUNCTIONAL STATUS PRIOR TO ADMISSION:  Patient with R AUBREE 2017 with recent dislocation 7/11/2024 requiring surgery at [facility] and then discharged to SNF. Admitted from SNF after dislocation at SNF. Prior to most recent surgeries and [facility] patient overall Mod I using a cane for mobility.    , ADL Assistance: Independent,  ,  ,  ,  ,  , Homemaking Assistance: Independent, Ambulation Assistance: Independent, Transfer Assistance: Independent, Active : No         HOME SUPPORT: Lives in group home, unclear full level of assist available     Occupational Therapy Goals  Initiated 7/27/2024    1. Patient will perform lower body dressing with AE PRN with Minimal Assist within 7 day(s).  2. Patient will perform upper body ADLS standing for 5 minutes without fatigue or LOB with Contact Guard Assist within 7 days.  3. Patient will perform toilet transfers with Moderate Assist using Bedside Commode Over Toilet within 7 days.  4. Patient will perform all aspects of toileting at Moderate Assist within 7 days.  5. Patient will utilize energy conservation techniques during functional activities without cues within 7 day(s).  6. Patient will adhere to posterior hip precautions without cues within 7 days.       Outcome: Progressing   OCCUPATIONAL THERAPY EVALUATION    Patient: Jayden Thompson (73 y.o. 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 ([facility]) [T84.020A]  Procedure(s) (LRB):  RIGHT HIP CLOSED REDUCTION (Right) 1 Day Post-Op     Precautions: Fall Risk, Weight Bearing Right Lower Extremity Weight Bearing: Weight Bearing As Tolerated           Hip Precautions: Posterior hip precautions, No ADduction, No hip internal  X2      Balance:     Balance  Sitting: Impaired  Sitting - Static: Good (unsupported)  Sitting - Dynamic: Fair (occasional)  Standing: Impaired  Standing - Static: Constant support;Good  Standing - Dynamic: Fair;Constant support        ADL Assessment:      Feeding: Independent     Grooming: Supervision     UE Bathing: Minimal assistance     LE Bathing: Maximum assistance     UE Dressing: Contact guard assistance     LE Dressing: Maximum assistance     Toileting: Moderate assistance          ADL Intervention and task modifications:      Grooming: .  - Oral Care: Contact Guard Assistance and Seated EOB            Lower Extremity Dressing: .  - Socks : Moderate Assistance and Seated EOB                  [facility] \"[facility]\"                                                       Daily Activity Inpatient Short Form  AM-PAC Daily Activity - Inpatient   How much help is needed for putting on and taking off regular lower body clothing?: A Lot  How much help is needed for bathing (which includes washing, rinsing, drying)?: A Lot  How much help is needed for toileting (which includes using toilet, bedpan, or urinal)?: A Lot  How much help is needed for putting on and taking off regular upper body clothing?: A Little  How much help is needed for taking care of personal grooming?: None  How much help for eating meals?: None  AM-[facility] Inpatient Daily Activity Raw Score: 17  AM-PAC Inpatient ADL T-Scale Score : 37.26  ADL Inpatient CMS 0-100% Score: 50.11  ADL Inpatient CMS G-Code Modifier : CK     Interpretation of Tool:  Represents clinically-significant functional categories (i.e. Activities of daily living).  Cutoff score 39.4 (19) correlates to a good likelihood of discharging home versus a facility  Anamika Graff, Leanna Patton, Erich Austin, Alesia Galdamez, Gallo Solis, Jack Graff, AM-PAC “6-Clicks” Functional Assessment Scores Predict Acute Care Hospital Discharge Destination, Physical

## 2024-07-28 LAB
GLUCOSE BLD STRIP.AUTO-MCNC: 112 MG/DL (ref 65–117)
GLUCOSE BLD STRIP.AUTO-MCNC: 117 MG/DL (ref 65–117)
GLUCOSE BLD STRIP.AUTO-MCNC: 136 MG/DL (ref 65–117)
GLUCOSE BLD STRIP.AUTO-MCNC: 191 MG/DL (ref 65–117)
SERVICE CMNT-IMP: ABNORMAL
SERVICE CMNT-IMP: ABNORMAL
SERVICE CMNT-IMP: NORMAL
SERVICE CMNT-IMP: NORMAL

## 2024-07-28 PROCEDURE — 6360000002 HC RX W HCPCS: Performed by: ORTHOPAEDIC SURGERY

## 2024-07-28 PROCEDURE — 6370000000 HC RX 637 (ALT 250 FOR IP)

## 2024-07-28 PROCEDURE — 82962 GLUCOSE BLOOD TEST: CPT

## 2024-07-28 PROCEDURE — 1100000000 HC RM PRIVATE

## 2024-07-28 PROCEDURE — 97530 THERAPEUTIC ACTIVITIES: CPT

## 2024-07-28 PROCEDURE — 6370000000 HC RX 637 (ALT 250 FOR IP): Performed by: INTERNAL MEDICINE

## 2024-07-28 RX ADMIN — ASPIRIN 81 MG CHEWABLE TABLET 81 MG: 81 TABLET CHEWABLE at 12:57

## 2024-07-28 RX ADMIN — LURASIDONE HYDROCHLORIDE 80 MG: 80 TABLET ORAL at 12:57

## 2024-07-28 RX ADMIN — OXYCODONE 5 MG: 5 TABLET ORAL at 22:12

## 2024-07-28 RX ADMIN — ACETAMINOPHEN 650 MG: 325 TABLET ORAL at 22:12

## 2024-07-28 RX ADMIN — Medication 3 MG: at 22:12

## 2024-07-28 RX ADMIN — ENOXAPARIN SODIUM 40 MG: 100 INJECTION SUBCUTANEOUS at 12:57

## 2024-07-28 RX ADMIN — OXYCODONE 5 MG: 5 TABLET ORAL at 17:08

## 2024-07-28 RX ADMIN — OXYCODONE 5 MG: 5 TABLET ORAL at 06:41

## 2024-07-28 RX ADMIN — FENOFIBRATE 54 MG: 54 TABLET ORAL at 13:04

## 2024-07-28 RX ADMIN — Medication 3 MG: at 00:28

## 2024-07-28 RX ADMIN — BUPROPION HYDROCHLORIDE 150 MG: 150 TABLET, EXTENDED RELEASE ORAL at 12:55

## 2024-07-28 RX ADMIN — PANTOPRAZOLE SODIUM 40 MG: 40 TABLET, DELAYED RELEASE ORAL at 06:40

## 2024-07-28 RX ADMIN — FINASTERIDE 5 MG: 5 TABLET, FILM COATED ORAL at 13:03

## 2024-07-28 RX ADMIN — LEVOTHYROXINE SODIUM 50 MCG: 0.05 TABLET ORAL at 06:40

## 2024-07-28 RX ADMIN — SERTRALINE HYDROCHLORIDE 200 MG: 50 TABLET ORAL at 12:55

## 2024-07-28 RX ADMIN — ATORVASTATIN CALCIUM 80 MG: 40 TABLET, FILM COATED ORAL at 22:13

## 2024-07-28 RX ADMIN — OXYCODONE 5 MG: 5 TABLET ORAL at 12:55

## 2024-07-28 ASSESSMENT — PAIN DESCRIPTION - DESCRIPTORS
DESCRIPTORS: ACHING

## 2024-07-28 ASSESSMENT — PAIN SCALES - GENERAL
PAINLEVEL_OUTOF10: 4
PAINLEVEL_OUTOF10: 4
PAINLEVEL_OUTOF10: 6
PAINLEVEL_OUTOF10: 6
PAINLEVEL_OUTOF10: 4
PAINLEVEL_OUTOF10: 5
PAINLEVEL_OUTOF10: 3
PAINLEVEL_OUTOF10: 6

## 2024-07-28 ASSESSMENT — PAIN DESCRIPTION - LOCATION
LOCATION: HIP
LOCATION: HIP
LOCATION: INCISION;HIP
LOCATION: HIP
LOCATION: HIP

## 2024-07-28 ASSESSMENT — PAIN DESCRIPTION - ORIENTATION
ORIENTATION: RIGHT

## 2024-07-28 ASSESSMENT — PAIN DESCRIPTION - PAIN TYPE
TYPE: SURGICAL PAIN

## 2024-07-28 ASSESSMENT — PAIN - FUNCTIONAL ASSESSMENT
PAIN_FUNCTIONAL_ASSESSMENT: PREVENTS OR INTERFERES SOME ACTIVE ACTIVITIES AND ADLS
PAIN_FUNCTIONAL_ASSESSMENT: PREVENTS OR INTERFERES SOME ACTIVE ACTIVITIES AND ADLS

## 2024-07-28 ASSESSMENT — PAIN DESCRIPTION - FREQUENCY
FREQUENCY: INTERMITTENT

## 2024-07-28 ASSESSMENT — PAIN DESCRIPTION - ONSET
ONSET: GRADUAL
ONSET: GRADUAL

## 2024-07-28 NOTE — PROGRESS NOTES
Daily    atorvastatin (LIPITOR) tablet 80 mg  80 mg Oral Nightly    buPROPion (WELLBUTRIN XL) extended release tablet 150 mg  150 mg Oral Daily    fenofibrate (TRICOR) tablet 54 mg  54 mg Oral Daily    finasteride (PROSCAR) tablet 5 mg  5 mg Oral Daily    levothyroxine (SYNTHROID) tablet 50 mcg  50 mcg Oral Daily    lurasidone (LATUDA) tablet 80 mg  80 mg Oral Daily with breakfast    pantoprazole (PROTONIX) tablet 40 mg  40 mg Oral QAM AC    sertraline (ZOLOFT) tablet 200 mg  200 mg Oral Daily    sodium chloride flush 0.9 % injection 5-40 mL  5-40 mL IntraVENous 2 times per day    sodium chloride flush 0.9 % injection 5-40 mL  5-40 mL IntraVENous PRN    0.9 % sodium chloride infusion   IntraVENous PRN    potassium chloride (KLOR-CON) extended release tablet 40 mEq  40 mEq Oral PRN    Or    potassium bicarb-citric acid (EFFER-K) effervescent tablet 40 mEq  40 mEq Oral PRN    Or    potassium chloride 10 mEq/100 mL IVPB (Peripheral Line)  10 mEq IntraVENous PRN    magnesium sulfate 2000 mg in 50 mL IVPB premix  2,000 mg IntraVENous PRN    ondansetron (ZOFRAN-ODT) disintegrating tablet 4 mg  4 mg Oral Q8H PRN    Or    ondansetron (ZOFRAN) injection 4 mg  4 mg IntraVENous Q6H PRN    polyethylene glycol (GLYCOLAX) packet 17 g  17 g Oral Daily PRN    acetaminophen (TYLENOL) tablet 650 mg  650 mg Oral Q6H PRN    Or    acetaminophen (TYLENOL) suppository 650 mg  650 mg Rectal Q6H PRN    insulin lispro (HUMALOG,ADMELOG) injection vial 0-8 Units  0-8 Units SubCUTAneous TID WC    insulin lispro (HUMALOG,ADMELOG) injection vial 0-4 Units  0-4 Units SubCUTAneous Nightly    glucose chewable tablet 16 g  4 tablet Oral PRN    dextrose bolus 10% 125 mL  125 mL IntraVENous PRN    Or    dextrose bolus 10% 250 mL  250 mL IntraVENous PRN    glucagon injection 1 mg  1 mg SubCUTAneous PRN    dextrose 10 % infusion   IntraVENous Continuous PRN    oxyCODONE (ROXICODONE) immediate release tablet 5 mg  5 mg Oral Q4H PRN    lactated ringers IV  soln infusion   IntraVENous Continuous    sodium chloride 0.9 % bolus 500 mL  500 mL IntraVENous Once    enoxaparin (LOVENOX) injection 40 mg  40 mg SubCUTAneous Daily    HYDROmorphone HCl PF (DILAUDID) injection 1 mg  1 mg IntraVENous Q4H PRN    propofol bolus 40 mg  40 mg IntraVENous PRN     ______________________________________________________________________  EXPECTED LENGTH OF STAY: Unable to retrieve estimated LOS  ACTUAL LENGTH OF STAY:          3                 Jarrod William, APRN - CNP

## 2024-07-28 NOTE — PLAN OF CARE
Problem: Physical Therapy - Adult  Goal: By Discharge: Performs mobility at highest level of function for planned discharge setting.  See evaluation for individualized goals.  Description: FUNCTIONAL STATUS PRIOR TO ADMISSION: Patient was modified independent using a rolling walker for functional mobility.  Pt poor historian.  Per case management - pt transferred from [place].    HOME SUPPORT PRIOR TO ADMISSION: Per pt - pt lived in group home.    Physical Therapy Goals  Initiated 7/26/2024  1.  Patient will move from supine to sit and sit to supine and scoot up and down in bed with modified independence within 7 day(s).    2.  Patient will perform sit to stand with modified independence within 7 day(s).  3.  Patient will transfer from bed to chair and chair to bed with modified independence using the least restrictive device within 7 day(s).  4.  Patient will ambulate with supervision for 125 feet with the least restrictive device within 7 day(s).   5.  Patient will ascend/descend 4 stairs with one handrail(s) with supervision within 7 day(s).  6.  Patient will perform THR home exercise program per protocol with supervision/set-up within 7 days.  7. Patient will verbalize and demonstrate understanding of posterior hip precautions per protocol within 4 days.     Outcome: Progressing     PHYSICAL THERAPY TREATMENT    Patient: Jayden Thompson (73 y.o. 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 ([facility])  Procedure(s) (LRB):  RIGHT HIP CLOSED REDUCTION (Right) 2 Days Post-Op  Precautions: Fall Risk, Weight Bearing Right Lower Extremity Weight Bearing: Weight Bearing As Tolerated           Hip Precautions: Posterior hip precautions, No ADduction, No hip internal rotation, No hip flexion > 90 degrees        ASSESSMENT:  Patient continues to benefit from skilled  with [place] decreased foot clearance B/L     Balance:  Balance  Sitting: Intact  Sitting - Static: Good (unsupported)  Sitting - Dynamic: Fair (occasional)  Standing: Impaired  Standing - Static: Constant support;Fair  Standing - Dynamic: Constant support;Fair                 Pain Rating:  - Reports hip pain but not rated   Pain Intervention(s):   rest and elevation  - Pt declined ice    Activity Tolerance:   Good    After treatment:   Patient left in no apparent distress sitting up in chair, Call bell within reach, and Bed/ chair alarm activated      COMMUNICATION/EDUCATION:   The patient's plan of care was discussed with: registered nurse    Patient Education  Education Given To: Patient  Education Provided: Role of Therapy;Plan of Care;Transfer Training;Fall Prevention Strategies;Precautions  Education Provided Comments: post hip prec  Education Method: Demonstration;Verbal  Barriers to Learning: None  Education Outcome: Verbalized understanding;Demonstrated understanding;Continued education needed      Makayla Pires, PT  Minutes: 29

## 2024-07-28 NOTE — PLAN OF CARE
Problem: Pain  Goal: Verbalizes/displays adequate comfort level or baseline comfort level  7/28/2024 0243 by Jose Dumont RN  Outcome: Progressing  7/27/2024 1448 by Jerica Fairchild LPN  Outcome: Progressing     Problem: Skin/Tissue Integrity  Goal: Absence of new skin breakdown  Description: 1.  Monitor for areas of redness and/or skin breakdown  2.  Assess vascular access sites hourly  3.  Every 4-6 hours minimum:  Change oxygen saturation probe site  4.  Every 4-6 hours:  If on nasal continuous positive airway pressure, respiratory therapy assess nares and determine need for appliance change or resting period.  7/28/2024 0243 by Jose Dumont RN  Outcome: Progressing  7/27/2024 1448 by Jerica Fairchild LPN  Outcome: Progressing     Problem: Safety - Adult  Goal: Free from fall injury  7/28/2024 0243 by Jose Dumont RN  Outcome: Progressing  7/27/2024 1448 by Jerica Fairchild LPN  Outcome: Progressing

## 2024-07-29 LAB
GLUCOSE BLD STRIP.AUTO-MCNC: 118 MG/DL (ref 65–117)
GLUCOSE BLD STRIP.AUTO-MCNC: 128 MG/DL (ref 65–117)
GLUCOSE BLD STRIP.AUTO-MCNC: 136 MG/DL (ref 65–117)
GLUCOSE BLD STRIP.AUTO-MCNC: 252 MG/DL (ref 65–117)
SERVICE CMNT-IMP: ABNORMAL

## 2024-07-29 PROCEDURE — 6370000000 HC RX 637 (ALT 250 FOR IP): Performed by: HOSPITALIST

## 2024-07-29 PROCEDURE — 6370000000 HC RX 637 (ALT 250 FOR IP)

## 2024-07-29 PROCEDURE — 97530 THERAPEUTIC ACTIVITIES: CPT

## 2024-07-29 PROCEDURE — 97535 SELF CARE MNGMENT TRAINING: CPT

## 2024-07-29 PROCEDURE — 6370000000 HC RX 637 (ALT 250 FOR IP): Performed by: INTERNAL MEDICINE

## 2024-07-29 PROCEDURE — 2580000003 HC RX 258: Performed by: INTERNAL MEDICINE

## 2024-07-29 PROCEDURE — 6360000002 HC RX W HCPCS: Performed by: ORTHOPAEDIC SURGERY

## 2024-07-29 PROCEDURE — 82962 GLUCOSE BLOOD TEST: CPT

## 2024-07-29 PROCEDURE — 1100000000 HC RM PRIVATE

## 2024-07-29 RX ADMIN — BUPROPION HYDROCHLORIDE 150 MG: 150 TABLET, EXTENDED RELEASE ORAL at 08:20

## 2024-07-29 RX ADMIN — OXYCODONE 5 MG: 5 TABLET ORAL at 23:01

## 2024-07-29 RX ADMIN — ATORVASTATIN CALCIUM 80 MG: 40 TABLET, FILM COATED ORAL at 21:00

## 2024-07-29 RX ADMIN — Medication 3 MG: at 22:21

## 2024-07-29 RX ADMIN — SODIUM CHLORIDE, PRESERVATIVE FREE 10 ML: 5 INJECTION INTRAVENOUS at 21:00

## 2024-07-29 RX ADMIN — ACETAMINOPHEN 650 MG: 325 TABLET ORAL at 23:01

## 2024-07-29 RX ADMIN — FENOFIBRATE 54 MG: 54 TABLET ORAL at 08:21

## 2024-07-29 RX ADMIN — LURASIDONE HYDROCHLORIDE 80 MG: 80 TABLET ORAL at 08:20

## 2024-07-29 RX ADMIN — ASPIRIN 81 MG CHEWABLE TABLET 81 MG: 81 TABLET CHEWABLE at 08:21

## 2024-07-29 RX ADMIN — SERTRALINE HYDROCHLORIDE 200 MG: 50 TABLET ORAL at 08:20

## 2024-07-29 RX ADMIN — ENOXAPARIN SODIUM 40 MG: 100 INJECTION SUBCUTANEOUS at 08:20

## 2024-07-29 RX ADMIN — SODIUM CHLORIDE, PRESERVATIVE FREE 10 ML: 5 INJECTION INTRAVENOUS at 08:26

## 2024-07-29 RX ADMIN — LEVOTHYROXINE SODIUM 50 MCG: 0.05 TABLET ORAL at 06:48

## 2024-07-29 RX ADMIN — OXYCODONE 5 MG: 5 TABLET ORAL at 01:53

## 2024-07-29 RX ADMIN — INSULIN LISPRO 4 UNITS: 100 INJECTION, SOLUTION INTRAVENOUS; SUBCUTANEOUS at 11:32

## 2024-07-29 RX ADMIN — FERROUS SULFATE TAB 325 MG (65 MG ELEMENTAL FE) 325 MG: 325 (65 FE) TAB at 08:21

## 2024-07-29 RX ADMIN — OXYCODONE 5 MG: 5 TABLET ORAL at 14:27

## 2024-07-29 RX ADMIN — PANTOPRAZOLE SODIUM 40 MG: 40 TABLET, DELAYED RELEASE ORAL at 06:48

## 2024-07-29 RX ADMIN — OXYCODONE 5 MG: 5 TABLET ORAL at 19:09

## 2024-07-29 RX ADMIN — OXYCODONE 5 MG: 5 TABLET ORAL at 08:19

## 2024-07-29 RX ADMIN — FINASTERIDE 5 MG: 5 TABLET, FILM COATED ORAL at 08:21

## 2024-07-29 ASSESSMENT — PAIN DESCRIPTION - DESCRIPTORS
DESCRIPTORS: ACHING

## 2024-07-29 ASSESSMENT — PAIN DESCRIPTION - LOCATION
LOCATION: HIP

## 2024-07-29 ASSESSMENT — PAIN SCALES - GENERAL
PAINLEVEL_OUTOF10: 6
PAINLEVEL_OUTOF10: 7
PAINLEVEL_OUTOF10: 6
PAINLEVEL_OUTOF10: 7
PAINLEVEL_OUTOF10: 7
PAINLEVEL_OUTOF10: 5
PAINLEVEL_OUTOF10: 5

## 2024-07-29 ASSESSMENT — PAIN DESCRIPTION - ORIENTATION
ORIENTATION: RIGHT

## 2024-07-29 ASSESSMENT — PAIN SCALES - WONG BAKER
WONGBAKER_NUMERICALRESPONSE: NO HURT
WONGBAKER_NUMERICALRESPONSE: NO HURT

## 2024-07-29 NOTE — PROGRESS NOTES
Progress Note    Status Post: Right hip closed reduction of dislocation in the operating room  Date of Surgery: 7/26/24  Surgeon: Dr. Alexandro Armenta    Assessment:   Jayden Thompson is a 73 y.o. year-old male with right hip dislocation after revision surgery at [facility] status post closed reduction in the operating room postoperative day 3    Plan:   -Weighbearing: WBAT RLE  -Strict posterior hip precautions.  He should use the knee immobilizer when ambulating.  When in bed he should have an abduction pillow between his legs.  -DVT prophylaxis: SCDs, frequent ambulation, Lovenox  -Antibiotics: None required  -Pain control: Continue as needed pain management, ice to operative area, elevate  -PT/OT for mobilization  -Dispo:   Discharge planning to Acute Rehab when ready  Follow up with with Dr. Benitez at [facility] as he was previously scheduled      Subjective:   No acute events over night. Jayden Thompson states that he is not doing well.  He says he is unhappy because he is still in house.  [facility]  Pain is controlled controlled.  He has not walked very far with physical therapy.    Denies CP, SOB, nausea/vomitting, parasthesias.     Objective:     Vitals:    07/29/24 1200   BP:    Pulse: 96   Resp:    Temp:    SpO2:        Patient Vitals for the past 24 hrs:   Temp Pulse Resp BP SpO2   07/29/24 1200 -- 96 -- -- --   07/29/24 1000 -- (!) 101 -- -- --   07/29/24 0849 -- -- 16 -- --   07/29/24 0803 98.1 °F (36.7 °C) 79 16 (!) 149/76 99 %   07/29/24 0600 -- 78 -- -- --   07/29/24 0400 -- 76 -- -- --   07/29/24 0245 98.6 °F (37 °C) 77 16 134/77 96 %   07/29/24 0200 -- 82 -- -- --   07/28/24 2212 98.9 °F (37.2 °C) 75 17 138/87 --   07/28/24 2200 -- 73 -- -- --   07/28/24 2000 -- 94 -- -- --   07/28/24 1800 -- 100 -- -- --   07/28/24 1708 -- -- 18 -- --   07/28/24 1516 98.2 °F (36.8 °C) 93 18 122/67 99 %   07/28/24 1400 -- 92 -- -- --        Physical Exam:  Gen: NAD,  AAOx3  Resp: No acute respiratory distress  MSK:  RLE:  Knee immobilizer in place  He is in the bed that the abduction pillow is off to the side  Motor intact distally  Sensation is intact to light touch distally        No intake or output data in the 24 hours ending 07/29/24 1323    LABS :  Recent Results (from the past 24 hour(s))   POCT Glucose    Collection Time: 07/28/24  5:11 PM   Result Value Ref Range    POC Glucose 117 65 - 117 mg/dL    Performed by: KATEY Lau    PCT    POCT Glucose    Collection Time: 07/28/24 10:16 PM   Result Value Ref Range    POC Glucose 136 (H) 65 - 117 mg/dL    Performed by: DORA LIM    POCT Glucose    Collection Time: 07/29/24  7:16 AM   Result Value Ref Range    POC Glucose 118 (H) 65 - 117 mg/dL    Performed by: ASA Robles    POCT Glucose    Collection Time: 07/29/24 11:14 AM   Result Value Ref Range    POC Glucose 252 (H) 65 - 117 mg/dL    Performed by: ERIK Armenta MD    07/29/24  1:23 PM        Andres Armenta M.D.  [facility]   [place] Office  Cell: [phone]  Office: [phone]  Medical Staff: Khadijah Gonzalez MA

## 2024-07-29 NOTE — PLAN OF CARE
Problem: Discharge Planning  Goal: Discharge to home or other facility with appropriate resources  7/29/2024 0849 by Karen Ta, RN  Outcome: Progressing     Problem: Skin/Tissue Integrity  Goal: Absence of new skin breakdown  Description: 1.  Monitor for areas of redness and/or skin breakdown  2.  Assess vascular access sites hourly  3.  Every 4-6 hours minimum:  Change oxygen saturation probe site  4.  Every 4-6 hours:  If on nasal continuous positive airway pressure, respiratory therapy assess nares and determine need for appliance change or resting period.  7/29/2024 0849 by Karen Ta, RN  Outcome: Progressing     Problem: Safety - Adult  Goal: Free from fall injury  7/29/2024 0849 by Karen Ta, RN  Outcome: Progressing     Problem: Pain  Goal: Verbalizes/displays adequate comfort level or baseline comfort level  7/29/2024 0849 by Karen Ta, RN  Outcome: Progressing

## 2024-07-29 NOTE — PLAN OF CARE
rotation  Chart, occupational therapy assessment, plan of care, and goals were reviewed.    ASSESSMENT  Patient continues to benefit from skilled OT services and is progressing towards goals. Pt presented seated in bed agreeable to therapy, with knee immobilizer on RLE, no abduction pillow. Pt re-educated on precautions including abduction pillow when in bed. Pt transferred to EOB Kalyani. Pt completed LB dressing using AE with overall ModA. Pt requiring 3 attempts for sit to stand t/f requiring ModA. Once standing Pt able to laterally step towards L side Kalyani. Abduction pillow placed between LE in bed totalA. Pt left in bed with all needs met.     PLAN :  Patient continues to benefit from skilled intervention to address the above impairments.  Continue treatment per established plan of care to address goals.    Recommend with staff: OOB to chair 3x/day Kalyani using RW, BSC for toileting    Recommendation for discharge: (in order for the patient to meet his/her long term goals): Therapy up to 5 days/week in Skilled nursing facility    Other factors to consider for discharge: patient's current support system is unable to meet their requirements for physical assistance, high risk for falls, and concern for safely navigating or managing the home environment    IF patient discharges home will need the following DME: continuing to assess with progress       SUBJECTIVE:   Patient stated “Do I have to wear this brace all the time?”    OBJECTIVE DATA SUMMARY:   Cognitive/Behavioral Status:     Cognition  Overall Cognitive Status: Exceptions  Arousal/Alertness: Appears intact  Following Commands: Appears intact  Attention Span: Appears intact  Memory: Decreased recall of precautions  Problem Solving: Decreased awareness of errors  Insights: Decreased awareness of deficits  Initiation: Appears intact  Sequencing: Appears intact    Functional Mobility and Transfers for ADLs:  Bed Mobility:  Bed Mobility Training  Bed Mobility  and energy conservation with Moderate Assist. Instructed on all components of hip kit with Good understanding.      Transfer Training  Transfer Training: Yes  Sit to Stand: Moderate assistance;Additional time  Stand to Sit: Minimum assistance  Bed to Chair: Minimum assistance;Adaptive equipment;Additional time      Pain Ratin/10 LLE  Pain Intervention(s):   nursing notified, repositioning, and abduction pillow placed between LE      Activity Tolerance:   Good    After treatment:   Patient left in no apparent distress in bed, Call bell within reach, and Bed/ chair alarm activated    COMMUNICATION/EDUCATION:   The patient's plan of care was discussed with: registered nurse    Patient Education  Education Given To: Patient  Education Provided: Role of Therapy;Plan of Care;ADL Adaptive Strategies;Transfer Training;Precautions  Education Method: Verbal;Demonstration;Teach Back  Barriers to Learning: None  Education Outcome: Verbalized understanding;Demonstrated understanding;Continued education needed    Thank you for this referral.  Adriana Candelaria OT  Minutes: 30

## 2024-07-29 NOTE — PLAN OF CARE
Problem: Physical Therapy - Adult  Goal: By Discharge: Performs mobility at highest level of function for planned discharge setting.  See evaluation for individualized goals.  Description: FUNCTIONAL STATUS PRIOR TO ADMISSION: Patient was modified independent using a rolling walker for functional mobility.  Pt poor historian.  Per case management - pt transferred from [place].    HOME SUPPORT PRIOR TO ADMISSION: Per pt - pt lived in group home.    Physical Therapy Goals  Initiated 7/26/2024  1.  Patient will move from supine to sit and sit to supine and scoot up and down in bed with modified independence within 7 day(s).    2.  Patient will perform sit to stand with modified independence within 7 day(s).  3.  Patient will transfer from bed to chair and chair to bed with modified independence using the least restrictive device within 7 day(s).  4.  Patient will ambulate with supervision for 125 feet with the least restrictive device within 7 day(s).   5.  Patient will ascend/descend 4 stairs with one handrail(s) with supervision within 7 day(s).  6.  Patient will perform THR home exercise program per protocol with supervision/set-up within 7 days.  7. Patient will verbalize and demonstrate understanding of posterior hip precautions per protocol within 4 days.     Outcome: Progressing       PHYSICAL THERAPY TREATMENT    Patient: Jayden Thompson (73 y.o. 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 ([facility])  Procedure(s) (LRB):  RIGHT HIP CLOSED REDUCTION (Right) 3 Days Post-Op  Precautions: Fall Risk, Weight Bearing Right Lower Extremity Weight Bearing: Weight Bearing As Tolerated           Hip Precautions: Posterior hip precautions, No hip flexion > 90 degrees, No hip internal rotation        ASSESSMENT:  Patient continues to benefit from skilled PT services                                                                                                                                                                                                Intervention/Education specific to: \"hip replacement\"    Reviewed education regarding posterior precautions. Patient able to state 1 when prompted                                                                                        Pain Ratin/10   Pain Intervention(s):       Activity Tolerance:   Fair     After treatment:   Patient left in no apparent distress sitting up in chair, Call bell within reach, and Bed/ chair alarm activated      COMMUNICATION/EDUCATION:   The patient's plan of care was discussed with: registered nurse    Patient Education  Education Given To: Patient  Education Provided: Role of Therapy;Plan of Care;Precautions  Education Provided Comments: post hip precautions  Education Method: Demonstration;Verbal  Barriers to Learning: None  Education Outcome: Continued education needed      Sumeet Cleveland, PT  Minutes: 23

## 2024-07-29 NOTE — PROGRESS NOTES
[facility]                                                                                          Hospitalist Progress Note  Jarrod William, CHEIKH - CNP  Answering service: [phone] OR [phone] from in house phone        Date of Service:  2024  NAME:  Jayden Thompson  :  1950  MRN:  [id number]       Admission Summary:   Mr. Thompson is a 73 y.o. male with past medical history significant for type 2 diabetes, dyslipidemia, hypothyroidism, anxiety/depression, BPH who presented to the ED with right hip pain - has a hx of right hip replacement () and has had a recent admission to [facility] on 7/10/2024 after a ground level fall which resulted in a right hip periprosthetic femur fracture. He was taken to the OR on 2024 and underwent a total revision of R hip and post - op was admitted to [facility].  Patient reported that he had leaned over in bed and attempted to pick an object up when he felt a sudden\"sound\" in the right hip followed by immediate pain 8/10 in severity.            Interval history / Subjective:     discharge pending authorization    no changes overnight, denies fever chest pain shortness of breath, no n/v/d     Assessment & Plan:      Dislocation of prosthesis of right hip POA   - pain mgmt   - lovenox for DVT ppx  - PT/OT  - to go back to SNF when able       Anxiety/depression   - continue Wellbutrin    Dyslipidemia   - continue Lipitor.    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

## 2024-07-30 LAB
ANION GAP SERPL CALC-SCNC: 6 MMOL/L (ref 5–15)
BASOPHILS # BLD: 0 K/UL (ref 0–0.1)
BASOPHILS NFR BLD: 1 % (ref 0–1)
BUN SERPL-MCNC: 26 MG/DL (ref 6–20)
BUN/CREAT SERPL: 15 (ref 12–20)
CALCIUM SERPL-MCNC: 8.9 MG/DL (ref 8.5–10.1)
CHLORIDE SERPL-SCNC: 104 MMOL/L (ref 97–108)
CO2 SERPL-SCNC: 28 MMOL/L (ref 21–32)
CREAT SERPL-MCNC: 1.71 MG/DL (ref 0.7–1.3)
DIFFERENTIAL METHOD BLD: ABNORMAL
EOSINOPHIL # BLD: 0.2 K/UL (ref 0–0.4)
EOSINOPHIL NFR BLD: 3 % (ref 0–7)
ERYTHROCYTE [DISTWIDTH] IN BLOOD BY AUTOMATED COUNT: 13.6 % (ref 11.5–14.5)
GLUCOSE BLD STRIP.AUTO-MCNC: 117 MG/DL (ref 65–117)
GLUCOSE BLD STRIP.AUTO-MCNC: 150 MG/DL (ref 65–117)
GLUCOSE BLD STRIP.AUTO-MCNC: 163 MG/DL (ref 65–117)
GLUCOSE BLD STRIP.AUTO-MCNC: 187 MG/DL (ref 65–117)
GLUCOSE SERPL-MCNC: 107 MG/DL (ref 65–100)
HCT VFR BLD AUTO: 26.5 % (ref 36.6–50.3)
HGB BLD-MCNC: 8.1 G/DL (ref 12.1–17)
IMM GRANULOCYTES # BLD AUTO: 0 K/UL (ref 0–0.04)
IMM GRANULOCYTES NFR BLD AUTO: 0 % (ref 0–0.5)
LYMPHOCYTES # BLD: 1.6 K/UL (ref 0.8–3.5)
LYMPHOCYTES NFR BLD: 21 % (ref 12–49)
MCH RBC QN AUTO: 26.8 PG (ref 26–34)
MCHC RBC AUTO-ENTMCNC: 30.6 G/DL (ref 30–36.5)
MCV RBC AUTO: 87.7 FL (ref 80–99)
MONOCYTES # BLD: 0.5 K/UL (ref 0–1)
MONOCYTES NFR BLD: 7 % (ref 5–13)
NEUTS SEG # BLD: 5.1 K/UL (ref 1.8–8)
NEUTS SEG NFR BLD: 68 % (ref 32–75)
NRBC # BLD: 0 K/UL (ref 0–0.01)
NRBC BLD-RTO: 0 PER 100 WBC
PLATELET # BLD AUTO: 345 K/UL (ref 150–400)
PMV BLD AUTO: 12.1 FL (ref 8.9–12.9)
POTASSIUM SERPL-SCNC: 4.3 MMOL/L (ref 3.5–5.1)
RBC # BLD AUTO: 3.02 M/UL (ref 4.1–5.7)
SERVICE CMNT-IMP: ABNORMAL
SERVICE CMNT-IMP: NORMAL
SODIUM SERPL-SCNC: 138 MMOL/L (ref 136–145)
WBC # BLD AUTO: 7.5 K/UL (ref 4.1–11.1)

## 2024-07-30 PROCEDURE — 97535 SELF CARE MNGMENT TRAINING: CPT

## 2024-07-30 PROCEDURE — 82962 GLUCOSE BLOOD TEST: CPT

## 2024-07-30 PROCEDURE — 97530 THERAPEUTIC ACTIVITIES: CPT

## 2024-07-30 PROCEDURE — 6360000002 HC RX W HCPCS: Performed by: ORTHOPAEDIC SURGERY

## 2024-07-30 PROCEDURE — 6370000000 HC RX 637 (ALT 250 FOR IP)

## 2024-07-30 PROCEDURE — 36415 COLL VENOUS BLD VENIPUNCTURE: CPT

## 2024-07-30 PROCEDURE — 80048 BASIC METABOLIC PNL TOTAL CA: CPT

## 2024-07-30 PROCEDURE — 6370000000 HC RX 637 (ALT 250 FOR IP): Performed by: INTERNAL MEDICINE

## 2024-07-30 PROCEDURE — 1100000000 HC RM PRIVATE

## 2024-07-30 PROCEDURE — 2580000003 HC RX 258: Performed by: INTERNAL MEDICINE

## 2024-07-30 PROCEDURE — 85025 COMPLETE CBC W/AUTO DIFF WBC: CPT

## 2024-07-30 RX ORDER — SENNA AND DOCUSATE SODIUM 50; 8.6 MG/1; MG/1
1 TABLET, FILM COATED ORAL 2 TIMES DAILY
Status: DISCONTINUED | OUTPATIENT
Start: 2024-07-30 | End: 2024-08-01 | Stop reason: HOSPADM

## 2024-07-30 RX ORDER — OXYCODONE HYDROCHLORIDE 5 MG/1
5 TABLET ORAL EVERY 4 HOURS PRN
Status: DISCONTINUED | OUTPATIENT
Start: 2024-07-30 | End: 2024-08-01 | Stop reason: HOSPADM

## 2024-07-30 RX ORDER — BISACODYL 10 MG
10 SUPPOSITORY, RECTAL RECTAL DAILY
Status: DISCONTINUED | OUTPATIENT
Start: 2024-07-30 | End: 2024-08-01 | Stop reason: HOSPADM

## 2024-07-30 RX ADMIN — PANTOPRAZOLE SODIUM 40 MG: 40 TABLET, DELAYED RELEASE ORAL at 06:21

## 2024-07-30 RX ADMIN — FINASTERIDE 5 MG: 5 TABLET, FILM COATED ORAL at 08:13

## 2024-07-30 RX ADMIN — SENNOSIDES AND DOCUSATE SODIUM 1 TABLET: 50; 8.6 TABLET ORAL at 12:32

## 2024-07-30 RX ADMIN — ATORVASTATIN CALCIUM 80 MG: 40 TABLET, FILM COATED ORAL at 20:49

## 2024-07-30 RX ADMIN — OXYCODONE HYDROCHLORIDE 5 MG: 5 TABLET ORAL at 19:16

## 2024-07-30 RX ADMIN — Medication 3 MG: at 20:50

## 2024-07-30 RX ADMIN — LURASIDONE HYDROCHLORIDE 80 MG: 80 TABLET ORAL at 08:09

## 2024-07-30 RX ADMIN — BUPROPION HYDROCHLORIDE 150 MG: 150 TABLET, EXTENDED RELEASE ORAL at 08:09

## 2024-07-30 RX ADMIN — ASPIRIN 81 MG CHEWABLE TABLET 81 MG: 81 TABLET CHEWABLE at 08:09

## 2024-07-30 RX ADMIN — SERTRALINE HYDROCHLORIDE 200 MG: 50 TABLET ORAL at 08:09

## 2024-07-30 RX ADMIN — OXYCODONE 5 MG: 5 TABLET ORAL at 15:07

## 2024-07-30 RX ADMIN — FENOFIBRATE 54 MG: 54 TABLET ORAL at 08:09

## 2024-07-30 RX ADMIN — ENOXAPARIN SODIUM 40 MG: 100 INJECTION SUBCUTANEOUS at 08:10

## 2024-07-30 RX ADMIN — POLYETHYLENE GLYCOL 3350 17 G: 17 POWDER, FOR SOLUTION ORAL at 09:21

## 2024-07-30 RX ADMIN — SODIUM CHLORIDE, PRESERVATIVE FREE 10 ML: 5 INJECTION INTRAVENOUS at 20:50

## 2024-07-30 RX ADMIN — OXYCODONE 5 MG: 5 TABLET ORAL at 06:21

## 2024-07-30 RX ADMIN — SENNOSIDES AND DOCUSATE SODIUM 1 TABLET: 50; 8.6 TABLET ORAL at 20:49

## 2024-07-30 RX ADMIN — SODIUM CHLORIDE, PRESERVATIVE FREE 10 ML: 5 INJECTION INTRAVENOUS at 08:10

## 2024-07-30 RX ADMIN — LEVOTHYROXINE SODIUM 50 MCG: 0.05 TABLET ORAL at 06:22

## 2024-07-30 ASSESSMENT — PAIN SCALES - GENERAL
PAINLEVEL_OUTOF10: 5
PAINLEVEL_OUTOF10: 7
PAINLEVEL_OUTOF10: 6
PAINLEVEL_OUTOF10: 6

## 2024-07-30 ASSESSMENT — PAIN SCALES - WONG BAKER
WONGBAKER_NUMERICALRESPONSE: NO HURT

## 2024-07-30 ASSESSMENT — PAIN DESCRIPTION - LOCATION
LOCATION: HIP
LOCATION: HIP

## 2024-07-30 ASSESSMENT — PAIN DESCRIPTION - ORIENTATION
ORIENTATION: RIGHT
ORIENTATION: RIGHT

## 2024-07-30 ASSESSMENT — PAIN DESCRIPTION - DESCRIPTORS
DESCRIPTORS: ACHING
DESCRIPTORS: ACHING

## 2024-07-30 NOTE — PLAN OF CARE
Problem: Physical Therapy - Adult  Goal: By Discharge: Performs mobility at highest level of function for planned discharge setting.  See evaluation for individualized goals.  Description: FUNCTIONAL STATUS PRIOR TO ADMISSION: Patient was modified independent using a rolling walker for functional mobility.  Pt poor historian.  Per case management - pt transferred from [place].    HOME SUPPORT PRIOR TO ADMISSION: Per pt - pt lived in group home.    Physical Therapy Goals  Initiated 7/26/2024  1.  Patient will move from supine to sit and sit to supine and scoot up and down in bed with modified independence within 7 day(s).    2.  Patient will perform sit to stand with modified independence within 7 day(s).  3.  Patient will transfer from bed to chair and chair to bed with modified independence using the least restrictive device within 7 day(s).  4.  Patient will ambulate with supervision for 125 feet with the least restrictive device within 7 day(s).   5.  Patient will ascend/descend 4 stairs with one handrail(s) with supervision within 7 day(s).  6.  Patient will perform THR home exercise program per protocol with supervision/set-up within 7 days.  7. Patient will verbalize and demonstrate understanding of posterior hip precautions per protocol within 4 days.     Outcome: Progressing       PHYSICAL THERAPY TREATMENT    Patient: Jayden Thompson (73 y.o. 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

## 2024-07-30 NOTE — PROGRESS NOTES
[facility]                                                                                          Hospitalist Progress Note  CHEIKH Moser NP  Answering service: [phone] OR [phone] from in house phone        Date of Service:  2024  NAME:  Jayden Thompson  :  1950  MRN:  [id number]       Admission Summary:   Mr. Thompson is a 73 y.o. male with past medical history significant for type 2 diabetes, dyslipidemia, hypothyroidism, anxiety/depression, BPH who presented to the ED with right hip pain - has a hx of right hip replacement () and has had a recent admission to [facility] on 7/10/2024 after a ground level fall which resulted in a right hip periprosthetic femur fracture. He was taken to the OR on 2024 and underwent a total revision of R hip and post - op was admitted to [facility].  Patient reported that he had leaned over in bed and attempted to pick an object up when he felt a sudden\"sound\" in the right hip followed by immediate pain 10 in severity.        Interval history / Subjective:   Patient seen and examined, lying in bed. Feels that he needs to have a BM, but unable to. Start Senokot-S with daily suppository.    Received insurance auth, now waiting for bed at [facility] - likely to have one on Thursday.     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 Oliveira (U)     Anxiety/depression   - continue Wellbutrin    Dyslipidemia   - continue Lipitor.    CKD, stage 3  - Cr 1.7 on 7/15 d/c paperwork from [facility] -- creatinine 1.71  - follow labs     Type 2 diabetes   - A1c 5.6  - glucose checks,  8.6-50 MG tablet 1 tablet  1 tablet Oral BID    bisacodyl (DULCOLAX) suppository 10 mg  10 mg Rectal Daily    ferrous sulfate (IRON 325) tablet 325 mg  325 mg Oral Q48H    melatonin tablet 3 mg  3 mg Oral Nightly PRN    albuterol sulfate HFA (PROVENTIL;VENTOLIN;PROAIR) 108 (90 Base) MCG/ACT inhaler 2 puff  2 puff Inhalation Q6H PRN    aspirin chewable tablet 81 mg  81 mg Oral Daily    atorvastatin (LIPITOR) tablet 80 mg  80 mg Oral Nightly    buPROPion (WELLBUTRIN XL) extended release tablet 150 mg  150 mg Oral Daily    fenofibrate (TRICOR) tablet 54 mg  54 mg Oral Daily    finasteride (PROSCAR) tablet 5 mg  5 mg Oral Daily    levothyroxine (SYNTHROID) tablet 50 mcg  50 mcg Oral Daily    lurasidone (LATUDA) tablet 80 mg  80 mg Oral Daily with breakfast    pantoprazole (PROTONIX) tablet 40 mg  40 mg Oral QAM AC    sertraline (ZOLOFT) tablet 200 mg  200 mg Oral Daily    sodium chloride flush 0.9 % injection 5-40 mL  5-40 mL IntraVENous 2 times per day    sodium chloride flush 0.9 % injection 5-40 mL  5-40 mL IntraVENous PRN    0.9 % sodium chloride infusion   IntraVENous PRN    potassium chloride (KLOR-CON) extended release tablet 40 mEq  40 mEq Oral PRN    Or    potassium bicarb-citric acid (EFFER-K) effervescent tablet 40 mEq  40 mEq Oral PRN    Or    potassium chloride 10 mEq/100 mL IVPB (Peripheral Line)  10 mEq IntraVENous PRN    magnesium sulfate 2000 mg in 50 mL IVPB premix  2,000 mg IntraVENous PRN    ondansetron (ZOFRAN-ODT) disintegrating tablet 4 mg  4 mg Oral Q8H PRN    Or    ondansetron (ZOFRAN) injection 4 mg  4 mg IntraVENous Q6H PRN    polyethylene glycol (GLYCOLAX) packet 17 g  17 g Oral Daily PRN    acetaminophen (TYLENOL) tablet 650 mg  650 mg Oral Q6H PRN    Or    acetaminophen (TYLENOL) suppository 650 mg  650 mg Rectal Q6H PRN    insulin lispro (HUMALOG,ADMELOG) injection vial 0-8 Units  0-8 Units SubCUTAneous TID WC    insulin lispro (HUMALOG,ADMELOG) injection vial 0-4 Units  0-4 Units

## 2024-07-30 NOTE — PLAN OF CARE
Problem: Occupational Therapy - Adult  Goal: By Discharge: Performs self-care activities at highest level of function for planned discharge setting.  See evaluation for individualized goals.  Description: FUNCTIONAL STATUS PRIOR TO ADMISSION:  Patient with R AUBREE 2017 with recent dislocation 7/11/2024 requiring surgery at [facility] and then discharged to SNF. Admitted from SNF after dislocation at SNF. Prior to most recent surgeries and [facility] patient overall Mod I using a cane for mobility.    , ADL Assistance: Independent,  ,  ,  ,  ,  , Homemaking Assistance: Independent, Ambulation Assistance: Independent, Transfer Assistance: Independent, Active : No      HOME SUPPORT: Lives in group home, unclear full level of assist available     Occupational Therapy Goals  Initiated 7/27/2024    1. Patient will perform lower body dressing with AE PRN with Minimal Assist within 7 day(s).  2. Patient will perform upper body ADLS standing for 5 minutes without fatigue or LOB with Contact Guard Assist within 7 days.  3. Patient will perform toilet transfers with Moderate Assist using Bedside Commode Over Toilet within 7 days.  4. Patient will perform all aspects of toileting at Moderate Assist within 7 days.  5. Patient will utilize energy conservation techniques during functional activities without cues within 7 day(s).  6. Patient will adhere to posterior hip precautions without cues within 7 days.     Outcome: Progressing   OCCUPATIONAL THERAPY TREATMENT  Patient: Jayden Thompson (73 y.o. 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 ([facility]) [T84.020A]  Procedure(s) (LRB):  RIGHT HIP CLOSED REDUCTION (Right) 4 Days Post-Op   Precautions: Fall Risk, Weight Bearing Right Lower Extremity Weight Bearing: Weight Bearing As Tolerated           Hip Precautions: Posterior hip precautions, No hip flexion > 90 degrees, No hip internal  Transfer: Minimum assistance;Adaptive equipment           Balance:     Balance  Sitting: Intact  Sitting - Static: Good (unsupported)  Sitting - Dynamic: Good (unsupported)  Standing: Impaired;With support  Standing - Static: Good  Standing - Dynamic: Fair      ADL Intervention:    Toileting: Maximum assistance  Toileting Skilled Clinical Factors: Pt requiring assistance for clothing management. Pt able to complete posterior hygiene while seated on elevated commode.    Functional Mobility: Minimal assistance  Functional Mobility Skilled Clinical Factors: RW     Skin Care: Chlorhexidine wipes    Pt educated on safe transfer techniques, with specific emphasis on proper hand placement to push up from seated surface rather than attempt to pull self up, fully positioning self in-front of desired seated location, feeling chair on back of legs and reaching back with 1-2 UE to slowly lower self to seated position. Educated on not leaning forward pst 90 degrees during sit to stand/stand to sit transfers to adhere to posterior hip precautions.    Pain Ratin/10  R hip  Pain Intervention(s):   ice and repositioning      Activity Tolerance:   Good and requires rest breaks  Please refer to the flowsheet for vital signs taken during this treatment.    After treatment:   Patient left in no apparent distress sitting up in chair, Call bell within reach, Bed/ chair alarm activated, and Updated patient's board on functional status and mobility recommendations    COMMUNICATION/EDUCATION:   The patient's plan of care was discussed with: physical therapist and registered nurse    Patient Education  Education Given To: Patient  Education Provided: Role of Therapy;Precautions;ADL Adaptive Strategies  Education Method: Verbal  Barriers to Learning: None  Education Outcome: Verbalized understanding;Continued education needed;Demonstrated understanding    Thank you for this referral.  Adriana Candelaria OT  Minutes: 29

## 2024-07-31 LAB
GLUCOSE BLD STRIP.AUTO-MCNC: 120 MG/DL (ref 65–117)
GLUCOSE BLD STRIP.AUTO-MCNC: 125 MG/DL (ref 65–117)
GLUCOSE BLD STRIP.AUTO-MCNC: 143 MG/DL (ref 65–117)
GLUCOSE BLD STRIP.AUTO-MCNC: 154 MG/DL (ref 65–117)
SERVICE CMNT-IMP: ABNORMAL

## 2024-07-31 PROCEDURE — 97530 THERAPEUTIC ACTIVITIES: CPT

## 2024-07-31 PROCEDURE — 82962 GLUCOSE BLOOD TEST: CPT

## 2024-07-31 PROCEDURE — 6360000002 HC RX W HCPCS: Performed by: ORTHOPAEDIC SURGERY

## 2024-07-31 PROCEDURE — 2580000003 HC RX 258: Performed by: INTERNAL MEDICINE

## 2024-07-31 PROCEDURE — 1100000000 HC RM PRIVATE

## 2024-07-31 PROCEDURE — 6370000000 HC RX 637 (ALT 250 FOR IP): Performed by: HOSPITALIST

## 2024-07-31 PROCEDURE — 6370000000 HC RX 637 (ALT 250 FOR IP)

## 2024-07-31 PROCEDURE — 6370000000 HC RX 637 (ALT 250 FOR IP): Performed by: INTERNAL MEDICINE

## 2024-07-31 PROCEDURE — 97535 SELF CARE MNGMENT TRAINING: CPT

## 2024-07-31 RX ADMIN — ASPIRIN 81 MG CHEWABLE TABLET 81 MG: 81 TABLET CHEWABLE at 07:55

## 2024-07-31 RX ADMIN — OXYCODONE HYDROCHLORIDE 5 MG: 5 TABLET ORAL at 18:59

## 2024-07-31 RX ADMIN — FINASTERIDE 5 MG: 5 TABLET, FILM COATED ORAL at 07:55

## 2024-07-31 RX ADMIN — SENNOSIDES AND DOCUSATE SODIUM 1 TABLET: 50; 8.6 TABLET ORAL at 07:55

## 2024-07-31 RX ADMIN — ENOXAPARIN SODIUM 40 MG: 100 INJECTION SUBCUTANEOUS at 07:57

## 2024-07-31 RX ADMIN — LURASIDONE HYDROCHLORIDE 80 MG: 80 TABLET ORAL at 07:55

## 2024-07-31 RX ADMIN — SODIUM CHLORIDE, PRESERVATIVE FREE 10 ML: 5 INJECTION INTRAVENOUS at 23:17

## 2024-07-31 RX ADMIN — FERROUS SULFATE TAB 325 MG (65 MG ELEMENTAL FE) 325 MG: 325 (65 FE) TAB at 06:24

## 2024-07-31 RX ADMIN — SERTRALINE HYDROCHLORIDE 200 MG: 50 TABLET ORAL at 07:55

## 2024-07-31 RX ADMIN — OXYCODONE HYDROCHLORIDE 5 MG: 5 TABLET ORAL at 11:07

## 2024-07-31 RX ADMIN — OXYCODONE HYDROCHLORIDE 5 MG: 5 TABLET ORAL at 06:23

## 2024-07-31 RX ADMIN — SENNOSIDES AND DOCUSATE SODIUM 1 TABLET: 50; 8.6 TABLET ORAL at 21:43

## 2024-07-31 RX ADMIN — ATORVASTATIN CALCIUM 80 MG: 40 TABLET, FILM COATED ORAL at 21:43

## 2024-07-31 RX ADMIN — LEVOTHYROXINE SODIUM 50 MCG: 0.05 TABLET ORAL at 06:24

## 2024-07-31 RX ADMIN — Medication 3 MG: at 21:43

## 2024-07-31 RX ADMIN — OXYCODONE HYDROCHLORIDE 5 MG: 5 TABLET ORAL at 23:43

## 2024-07-31 RX ADMIN — OXYCODONE HYDROCHLORIDE 5 MG: 5 TABLET ORAL at 14:50

## 2024-07-31 RX ADMIN — FENOFIBRATE 54 MG: 54 TABLET ORAL at 07:55

## 2024-07-31 RX ADMIN — PANTOPRAZOLE SODIUM 40 MG: 40 TABLET, DELAYED RELEASE ORAL at 06:24

## 2024-07-31 RX ADMIN — ACETAMINOPHEN 650 MG: 325 TABLET ORAL at 21:43

## 2024-07-31 RX ADMIN — BUPROPION HYDROCHLORIDE 150 MG: 150 TABLET, EXTENDED RELEASE ORAL at 07:55

## 2024-07-31 ASSESSMENT — PAIN SCALES - GENERAL
PAINLEVEL_OUTOF10: 5
PAINLEVEL_OUTOF10: 5
PAINLEVEL_OUTOF10: 6
PAINLEVEL_OUTOF10: 3
PAINLEVEL_OUTOF10: 7
PAINLEVEL_OUTOF10: 4
PAINLEVEL_OUTOF10: 7
PAINLEVEL_OUTOF10: 8
PAINLEVEL_OUTOF10: 7
PAINLEVEL_OUTOF10: 6

## 2024-07-31 ASSESSMENT — PAIN DESCRIPTION - LOCATION
LOCATION: HIP
LOCATION: LEG;HIP
LOCATION: HEAD

## 2024-07-31 ASSESSMENT — PAIN SCALES - WONG BAKER
WONGBAKER_NUMERICALRESPONSE: NO HURT

## 2024-07-31 ASSESSMENT — PAIN DESCRIPTION - ORIENTATION
ORIENTATION: RIGHT

## 2024-07-31 ASSESSMENT — PAIN DESCRIPTION - DESCRIPTORS
DESCRIPTORS: ACHING
DESCRIPTORS: ACHING;SORE;TENDER
DESCRIPTORS: ACHING

## 2024-07-31 ASSESSMENT — PAIN - FUNCTIONAL ASSESSMENT: PAIN_FUNCTIONAL_ASSESSMENT: PREVENTS OR INTERFERES SOME ACTIVE ACTIVITIES AND ADLS

## 2024-07-31 NOTE — PLAN OF CARE
Problem: Discharge Planning  Goal: Discharge to home or other facility with appropriate resources  Outcome: Progressing  Flowsheets (Taken 7/30/2024 2051 by Sigrid Kennedy RN)  Discharge to home or other facility with appropriate resources: Identify barriers to discharge with patient and caregiver     Problem: Skin/Tissue Integrity  Goal: Absence of new skin breakdown  Description: 1.  Monitor for areas of redness and/or skin breakdown  2.  Assess vascular access sites hourly  3.  Every 4-6 hours minimum:  Change oxygen saturation probe site  4.  Every 4-6 hours:  If on nasal continuous positive airway pressure, respiratory therapy assess nares and determine need for appliance change or resting period.  Outcome: Progressing     Problem: Safety - Adult  Goal: Free from fall injury  Outcome: Progressing     Problem: Pain  Goal: Verbalizes/displays adequate comfort level or baseline comfort level  Outcome: Progressing

## 2024-07-31 NOTE — PROGRESS NOTES
[facility]                                                                                          Hospitalist Progress Note  CHEIKH Moser NP  Answering service: [phone] OR [phone] from in house phone        Date of Service:  2024  NAME:  Jayden Thompson  :  1950  MRN:  [id number]       Admission Summary:   Mr. Thompson is a 73 y.o. male with past medical history significant for type 2 diabetes, dyslipidemia, hypothyroidism, anxiety/depression, BPH who presented to the ED with right hip pain - has a hx of right hip replacement () and has had a recent admission to [facility] on 7/10/2024 after a ground level fall which resulted in a right hip periprosthetic femur fracture. He was taken to the OR on 2024 and underwent a total revision of R hip and post - op was admitted to [facility].  Patient reported that he had leaned over in bed and attempted to pick an object up when he felt a sudden\"sound\" in the right hip followed by immediate pain 8/10 in severity.        Interval history / Subjective:   Patient seen and examined, lying in bed. Looking forward to leaving hospital. Had BM yesterday.    [facility] to have bed available tomorrow. Medically stable for discharge.     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 Oliveira (U)     Anxiety/depression   - continue Wellbutrin    Dyslipidemia   - continue Lipitor.    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 *         Social Determinants of Health     Tobacco Use: Not on file   Alcohol Use: Not At Risk (7/25/2024)    AUDIT-C     Frequency of Alcohol Consumption: Never     Average Number of Drinks: Patient does not drink     Frequency of Binge Drinking: Never   Financial Resource Strain: Not on file   Food Insecurity: Not on file   Transportation Needs: Not on file   Physical Activity: Not on file   Stress: Not on file   Social Connections: Not on file   Intimate Partner Violence: Not on file   Depression: Not on file   Housing Stability: Not on file   Interpersonal Safety: Not At Risk (7/25/2024)    Interpersonal Safety Domain Source: IP Abuse Screening     Physical abuse: Denies     Verbal abuse: Denies     Emotional abuse: Denies     Financial abuse: Denies     Sexual abuse: Denies   Utilities: Not on file       Review of Systems:   Had  7/30      Vital Signs:    Last 24hrs VS reviewed since prior progress note. Most recent are:  Vitals:    07/31/24 1137   BP:    Pulse:    Resp: 16   Temp:    SpO2:          Intake/Output Summary (Last 24 hours) at 7/31/2024 1708  Last data filed at 7/31/2024 0307  Gross per 24 hour   Intake 60 ml   Output 710 ml   Net -650 ml          Physical Examination:     I had a face to face encounter with this patient and independently examined them on 7/31/2024 as outlined below:          General : alert x 3, awake, no acute distress  HEENT: PEERL, EOMI, moist mucus membrane  Neck: supple, no JVD, no meningeal signs  Chest: Clear to auscultation bilaterally   CVS: S1 S2 heard, Capillary refill less than 2 seconds  Abd: soft/non tender, non

## 2024-07-31 NOTE — PLAN OF CARE
rotation  Chart, occupational therapy assessment, plan of care, and goals were reviewed.    ASSESSMENT  Patient continues to benefit from skilled OT services and is slowly progressing towards goals. Pt demonstrating poor recall of precautions, requiring cues for placement of abduction pillow while laying in bed and for bed mobility. Pt assisted with bowel hygiene at bed level with MaxA. Pt able to roll R/L Connie. Pt reports overall feeling poorly. Pt deferred transfer to commode for further toileting. Pt left in bed with all needs met.       PLAN :  Patient continues to benefit from skilled intervention to address the above impairments.  Continue treatment per established plan of care to address goals.    Recommend with staff: OOB to chair 3x/day via RW, abduction pillow when in bed, functional mobility to bathroom Connie.    Recommendation for discharge: (in order for the patient to meet his/her long term goals): Therapy up to 5 days/week in Skilled nursing facility    Other factors to consider for discharge: patient's current support system is unable to meet their requirements for physical assistance, poor safety awareness, high risk for falls, and not safe to be alone    IF patient discharges home will need the following DME: none       SUBJECTIVE:   Patient stated “I feel terrible.”    OBJECTIVE DATA SUMMARY:   Cognitive/Behavioral Status:     Cognition  Overall Cognitive Status: Exceptions  Arousal/Alertness: Appears intact  Following Commands: Appears intact  Attention Span: Appears intact  Memory: Decreased recall of precautions  Safety Judgement: Impaired  Problem Solving: Decreased awareness of errors  Insights: Decreased awareness of deficits    Functional Mobility and Transfers for ADLs:  Bed Mobility:  Bed Mobility Training  Bed Mobility Training: Yes  Rolling: Minimum assistance  Supine to Sit: Minimum assistance  Scooting: Minimum assistance     Transfers:   Transfer Training  Transfer Training: Yes  Sit  to Stand: Moderate assistance;Adaptive equipment (elevated surface)  Stand to Sit: Minimum assistance           Balance:     Balance  Sitting: Impaired  Sitting - Static: Good (unsupported)  Sitting - Dynamic: Good (unsupported)  Standing: Impaired;With support  Standing - Static: Good  Standing - Dynamic: Fair      ADL Intervention:    Toileting: Maximum assistance  Toileting Skilled Clinical Factors: Pt rolling R/L Connie in bed with totalA to place abduction pillow prior to rolling. Pt requiring totalA for bowel hygiene.    Skin Care: Bath wipes    Pain Rating:  No pain reported   Pain Intervention(s):   ice and repositioning      Activity Tolerance:   Fair     After treatment:   Patient left in no apparent distress in bed, Call bell within reach, and abduction pillow between LE, Knee immobilizer on RLE    COMMUNICATION/EDUCATION:   The patient's plan of care was discussed with: physical therapist    Patient Education  Education Given To: Patient  Education Provided: Role of Therapy;Plan of Care;ADL Adaptive Strategies;Precautions  Education Method: Verbal  Barriers to Learning: None  Education Outcome: Verbalized understanding    Thank you for this referral.  Adriana Candelaria OT  Minutes: 19

## 2024-07-31 NOTE — PLAN OF CARE
Problem: Physical Therapy - Adult  Goal: By Discharge: Performs mobility at highest level of function for planned discharge setting.  See evaluation for individualized goals.  Description: FUNCTIONAL STATUS PRIOR TO ADMISSION: Patient was modified independent using a rolling walker for functional mobility.  Pt poor historian.  Per case management - pt transferred from [facility].    HOME SUPPORT PRIOR TO ADMISSION: Per pt - pt lived in group home.    Physical Therapy Goals  Initiated 7/26/2024  1.  Patient will move from supine to sit and sit to supine and scoot up and down in bed with modified independence within 7 day(s).    2.  Patient will perform sit to stand with modified independence within 7 day(s).  3.  Patient will transfer from bed to chair and chair to bed with modified independence using the least restrictive device within 7 day(s).  4.  Patient will ambulate with supervision for 125 feet with the least restrictive device within 7 day(s).   5.  Patient will ascend/descend 4 stairs with one handrail(s) with supervision within 7 day(s).  6.  Patient will perform THR home exercise program per protocol with supervision/set-up within 7 days.  7. Patient will verbalize and demonstrate understanding of posterior hip precautions per protocol within 4 days.     Outcome: Progressing       PHYSICAL THERAPY TREATMENT    Patient: Jayden Thompson (73 y.o. male)  Date: 7/31/2024  Diagnosis: Dislocation of right hip, initial encounter ([facility]) [S73.004A]  Dislocation of prosthesis of right hip joint ([facility]) [T84.020A] Dislocation of prosthesis of right hip joint ([facility])  Procedure(s) (LRB):  RIGHT HIP CLOSED REDUCTION (Right) 5 Days Post-Op  Precautions: Fall Risk, Weight Bearing Right Lower Extremity Weight Bearing: Weight Bearing As Tolerated           Hip Precautions: Posterior hip precautions, No hip flexion > 90 degrees, No hip internal rotation        ASSESSMENT:  Patient continues to benefit from skilled PT services  and is slowly progressing towards goals. Patient demonstrated improved upright posture with gait, improved gait speed. Patient's gait limited by knee immobilizer. Patient able to stand with stand by assist for approx 1 minute.          PLAN:  Patient continues to benefit from skilled intervention to address the above impairments.  Continue treatment per established plan of care.    Recommend with staff: therapy recommendations for staff: Recommend mobility with staff assist x2 using rolling walker.      Recommendation for discharge: (in order for the patient to meet his/her long term goals): Therapy up to 5 days/week in Skilled nursing facility    Other factors to consider for discharge: patient's current support system is unable to meet their requirements for physical assistance    IF patient discharges home will need the following DME: rolling walker       SUBJECTIVE:   Patient stated, \"Ok.\"    OBJECTIVE DATA SUMMARY:   Critical Behavior:      Patient received supine with knee immobilizer, abd wedge, room air    Functional Mobility Training:  Bed Mobility:  Bed Mobility Training  Bed Mobility Training: Yes  Supine to Sit: Minimum assistance  Scooting: Minimum assistance  Transfers:  Transfer Training  Transfer Training: Yes  Sit to Stand: Moderate assistance;Adaptive equipment (elevated surface)  Stand to Sit: Minimum assistance  Balance:  Balance  Sitting: Impaired  Sitting - Static: Good (unsupported)  Sitting - Dynamic: Good (unsupported)  Standing: Impaired;With support  Standing - Static: Good  Standing - Dynamic: Fair   Ambulation/Gait Training:     Gait  Gait Training: Yes  Right Side Weight Bearing: As tolerated  Overall Level of Assistance: Contact-guard assistance  Distance (ft): 20 Feet  Assistive Device: Brace/splint;Gait belt;Walker, rolling                    Pain Rating:  No complaints of pain   Pain Intervention(s):       Activity Tolerance:   Fair     After treatment:   Patient left in no apparent

## 2024-08-01 VITALS
WEIGHT: 164.46 LBS | TEMPERATURE: 97.6 F | DIASTOLIC BLOOD PRESSURE: 78 MMHG | BODY MASS INDEX: 22.28 KG/M2 | SYSTOLIC BLOOD PRESSURE: 147 MMHG | RESPIRATION RATE: 15 BRPM | HEIGHT: 72 IN | HEART RATE: 81 BPM | OXYGEN SATURATION: 98 %

## 2024-08-01 LAB
GLUCOSE BLD STRIP.AUTO-MCNC: 107 MG/DL (ref 65–117)
GLUCOSE BLD STRIP.AUTO-MCNC: 191 MG/DL (ref 65–117)
SERVICE CMNT-IMP: ABNORMAL
SERVICE CMNT-IMP: NORMAL

## 2024-08-01 PROCEDURE — 2580000003 HC RX 258: Performed by: INTERNAL MEDICINE

## 2024-08-01 PROCEDURE — 6370000000 HC RX 637 (ALT 250 FOR IP): Performed by: INTERNAL MEDICINE

## 2024-08-01 PROCEDURE — 82962 GLUCOSE BLOOD TEST: CPT

## 2024-08-01 PROCEDURE — 6360000002 HC RX W HCPCS: Performed by: ORTHOPAEDIC SURGERY

## 2024-08-01 PROCEDURE — 6370000000 HC RX 637 (ALT 250 FOR IP)

## 2024-08-01 RX ADMIN — FENOFIBRATE 54 MG: 54 TABLET ORAL at 10:06

## 2024-08-01 RX ADMIN — LURASIDONE HYDROCHLORIDE 80 MG: 80 TABLET ORAL at 10:18

## 2024-08-01 RX ADMIN — LEVOTHYROXINE SODIUM 50 MCG: 0.05 TABLET ORAL at 06:34

## 2024-08-01 RX ADMIN — BUPROPION HYDROCHLORIDE 150 MG: 150 TABLET, EXTENDED RELEASE ORAL at 10:06

## 2024-08-01 RX ADMIN — ASPIRIN 81 MG CHEWABLE TABLET 81 MG: 81 TABLET CHEWABLE at 10:06

## 2024-08-01 RX ADMIN — BISACODYL 10 MG: 10 SUPPOSITORY RECTAL at 10:06

## 2024-08-01 RX ADMIN — SENNOSIDES AND DOCUSATE SODIUM 1 TABLET: 50; 8.6 TABLET ORAL at 10:06

## 2024-08-01 RX ADMIN — ENOXAPARIN SODIUM 40 MG: 100 INJECTION SUBCUTANEOUS at 10:05

## 2024-08-01 RX ADMIN — PANTOPRAZOLE SODIUM 40 MG: 40 TABLET, DELAYED RELEASE ORAL at 06:34

## 2024-08-01 RX ADMIN — SERTRALINE HYDROCHLORIDE 200 MG: 50 TABLET ORAL at 10:06

## 2024-08-01 RX ADMIN — FINASTERIDE 5 MG: 5 TABLET, FILM COATED ORAL at 10:22

## 2024-08-01 RX ADMIN — SODIUM CHLORIDE, PRESERVATIVE FREE 10 ML: 5 INJECTION INTRAVENOUS at 10:07

## 2024-08-01 RX ADMIN — OXYCODONE HYDROCHLORIDE 5 MG: 5 TABLET ORAL at 10:47

## 2024-08-01 ASSESSMENT — PAIN SCALES - WONG BAKER
WONGBAKER_NUMERICALRESPONSE: NO HURT
WONGBAKER_NUMERICALRESPONSE: NO HURT

## 2024-08-01 ASSESSMENT — PAIN DESCRIPTION - ORIENTATION: ORIENTATION: RIGHT;LEFT

## 2024-08-01 ASSESSMENT — PAIN SCALES - GENERAL
PAINLEVEL_OUTOF10: 0
PAINLEVEL_OUTOF10: 6
PAINLEVEL_OUTOF10: 1

## 2024-08-01 ASSESSMENT — PAIN DESCRIPTION - DESCRIPTORS: DESCRIPTORS: ACHING

## 2024-08-01 ASSESSMENT — PAIN DESCRIPTION - LOCATION: LOCATION: ABDOMEN

## 2024-08-01 NOTE — CARE COORDINATION
ANALIA:  Anticipated d/c return to [facility]; CM called to confirm arrival time for today 7/30 and was told that now [facility] does not anticipate having a bed until Thursday.     CM updated Pt about plans for move to [facility] on Thursday. Pt expressed understanding timeline.     CM worked with liaisons and admissions at [facility] to acquire cell phone on behalf of Pt. [facility] staff delivered phone and  to Pt at bedside.     Alejandra at [facility] [phone]    [facility]; [facility] contact Ana Paula Muro at [facility] on AUTH: [phone]. Ext: [phone]    [facility] FAX - [phone]     CM sent updated clinicals to [facility] 7/30  --  Pt moved to [place] about 18 months ago to live near only living/close family, brother Poli Thompson     Pt reports that his cell phone is currently at [facility].     Will need to send d/c to [facility] [phone] ([id number])    Pt owns walker and cane    Pt reports that address on his face sheet is his group home, [facility] [phone] , where he lived prior to acute care and SNF admissions.     Transition of Care Plan:    RUR:11%   Prior Level of Functioning: snf  Disposition: snf  If SNF or IPR: Date FOC offered: 7/26  Date FOC received 7/26   Accepting facility: [facility]  Date authorization started with reference number: 7/27  Date authorization received and expires:   Follow up appointments: snf  DME needed: snf  Transportation at discharge:   IM/[facility] Medicare/ letter given: y  Is patient a [place] and connected with VA?    If yes, was [place] transfer form completed and VA notified?   Caregiver Contact: Poli Thompson, brother, Pt does not know his phone number  Discharge Caregiver contacted prior to discharge?   Care Conference needed?   Barriers to discharge:  bed availability  
ANALIA:  Anticipated d/c return to [facility]; CM called to determine if bed opened up today. Still no bed, but [facility] states that Pt can come to [facility] after 14:00 on Thursday 8/1     CM updated Pt about tomorrow for d/c.  Ride booked with SUSIE derek during 3p slot for Thursday 8/1    Alejandra at [facility] [phone]    AUTH is secured from the [facility]; VA contact Ana Paula Jackiealexandria at [facility] on AUTH: [phone]. Ext: [phone]    [facility] FAX - [phone]     --  Pt moved to [place] about 18 months ago to live near only living/close family, brother Poli Thompson     Pt reports that his cell phone is currently at [facility].     Will need to send d/c to [facility] [phone] ([id number])    Pt owns walker and cane    Pt reports that address on his face sheet is his group home, [facility] [phone] , where he lived prior to acute care and SNF admissions.     Transition of Care Plan:    RUR:11%   Prior Level of Functioning: snf  Disposition: snf  If SNF or IPR: Date FOC offered: 7/26  Date FOC received 7/26   Accepting facility: [facility]  Date authorization started with reference number: 7/27  Date authorization received and expires:   Follow up appointments: snf  DME needed: snf  Transportation at discharge:   IM/IMM Medicare/ letter given: y  Is patient a  and connected with VA?    If yes, was  transfer form completed and VA notified?   Caregiver Contact: Poli Thompson, brother, Pt does not know his phone number  Discharge Caregiver contacted prior to discharge?   Care Conference needed?   Barriers to discharge:  bed availability  
ANALIA:  Anticipated d/c return to [facility], where he came from     called [facility] Admissions and spoke with Alejandra [phone]  who confirmed that Pt can return to [facility]. AUTH was started on Sat 7/27    --  Pt moved to [place] about 18 months ago to live near only living/close family, brother Poli Thompson     Pt reports that his cell phone is currently at [facility].     Will need to send d/c to [facility] [phone] ([id number])    Pt owns walker and cane    Pt reports that address on his face sheet is his group home, [facility] [phone] , where he lived prior to acute care and SNF admissions.     Transition of Care Plan:    RUR:14%   Prior Level of Functioning: snf  Disposition: snf  If SNF or IPR: Date FOC offered: 7/26  Date FOC received 7/26   Accepting facility: [facility]  Date authorization started with reference number: 7/27  Date authorization received and expires:   Follow up appointments: snf  DME needed: snf  Transportation at discharge:   IM/[facility] Medicare/ letter given: y  Is patient a  and connected with VA?    If yes, was  transfer form completed and VA notified?   Caregiver Contact: Poli Thompson, brother, Pt does not know his phone number  Discharge Caregiver contacted prior to discharge?   Care Conference needed?   Barriers to discharge:  AUTH for SNF  
ANALIA:  Anticipated d/c return to [facility], where he came from_AUTH pending still as of 7/29. Per Alejandra at [facility] [phone] , they were notified that [facility]. They are working with Ana Paula Muro at [facility] on AUTH; Ana Paula updated CM that Auth is still approved/ongoing.     It is anticipated that [facility] will have a room available on Tue 7/30.    CM also touched base with Ana Paula Muro at [facility] and faxed face sheet.  [phone]. Ext: [phone]    [facility] FAX - [phone]     CM inquired with admissions at [facility] about the patient's cell phone; they confirm that they have it and will bring phone and  to the Pt this afternoon after work. CM informed Pt about cell phone location and delivery.     --  Pt moved to [place] about 18 months ago to live near only living/close family, brother Poli Donna     Pt reports that his cell phone is currently at [facility].     Will need to send d/c to [facility] [phone] ([id number])    Pt owns walker and cane    Pt reports that address on his face sheet is his group home, [facility] [phone] , where he lived prior to acute care and SNF admissions.     Transition of Care Plan:    RUR:12%   Prior Level of Functioning: snf  Disposition: snf  If SNF or IPR: Date FOC offered: 7/26  Date FOC received 7/26   Accepting facility: [facility]  Date authorization started with reference number: 7/27  Date authorization received and expires:   Follow up appointments: snf  DME needed: snf  Transportation at discharge:   IM/IMM Medicare/ letter given: y  Is patient a [place] and connected with VA?    If yes, was  transfer form completed and VA notified?   Caregiver Contact: Poli Thompson, brother, Pt does not know his phone number  Discharge Caregiver contacted prior to discharge?   Care Conference needed?   Barriers to discharge:  AUTH for SNF  
ANALIA:  Anticipated d/c return to [facility]F, where he came from  Moved to [place] about 18 months ago to live near only living/close family, brother Poli Thompson (Pt did not recall brother's phone number).     Pt reports that his cell phone is currently at [facility].     CM placed referral in EPIC Link to [facility] to notify them of Pt's location and likely need for SNF at d/c. CM also notified [facility] that Pt would like to try to acquire his cell phone from them    CM faxed H&P to [facility] [phone] ([id number])    Pt owns walker and cane    Pt reports that address on his face sheet is his group home, [facility] [phone] , where he lived prior to acute care and SNF admissions.     CM reviewed face sheet, interest in returning to [facility], and IM letter    Care Management Initial Assessment       RUR:15%  Readmission? No  1st IM letter given? Yes - 7/26 07/26/24 1527   Service Assessment   Patient Orientation Alert and Oriented   Cognition Alert   History Provided By Patient   Primary Caregiver Other (Comment)  (SNF staff)   Support Systems Family Members   Patient's Healthcare Decision Maker is: Legal Next of Kin   PCP Verified by JAYE No  (sees PCP at [facility], does not know their name)   Prior Functional Level Assistance with the following:;Bathing;Dressing;Toileting;Mobility   Current Functional Level Assistance with the following:;Bathing;Dressing;Toileting;Mobility   Can patient return to prior living arrangement Unknown at present   Ability to make needs known: Good   Family able to assist with home care needs: Yes   Discharge Planning   Patient expects to be discharged to: Skilled nursing facility   Condition of Participation: Discharge Planning   The Plan for Transition of Care is related to the following treatment goals: Rehab   The Patient and/or Patient Representative was provided with a Choice of Provider? Patient   The Patient and/Or Patient 
Transition of Care: Anticipate discharge to [facility] but will need authorization -[facility] new [facility].   RUR 14%  Transportation: KAYLAH CEE faxed H&P to [facility] Fax-[phone] (Phone [phone])     
manage the patient care needs for the following:     Juan Pablo with (X) only those applicable:  Medication:  [x]Medications are available at the facility  []IV Antibiotics    []Controlled Substance - hard copies available sent.  []Weekly Labs    Equipment:  []CPAP/BiPAP  []Wound Vacuum  []Mazariegos or Urinary Device  []PICC/Central Line  []Nebulizer  []Ventilator    Treatment:  []Isolation (for MRSA, VRE, etc.)  []Surgical Drain Management  []Tracheostomy Care  []Dressing Changes  []Dialysis with transportation  []PEG Care  []Oxygen  []Daily Weights for Heart Failure    Dietary:  []Any diet limitations  []Tube Feedings   []Total Parenteral Management (TPN)    Financial Resources:  []Medicaid Application Completed    []UAI Completed and copy given to pt/family  and copy given to pt/family  []A screening has previously been completed.    []Level II Completed    [] Private pay individual who will not become   financially eligible for Medicaid within 6 months from admission to a [facility].     [] Individual refused to have screening conducted.     []Medicaid Application Completed    []The screening denied because it was determined individual did not need/did not qualify for nursing facility level of care.  [] Out of state residents seeking direct admission to a VA nursing facility.  [] Individuals who are inpatients of an out of state hospital, or in state or out of state veterans/ hospital and seek direct admission to a VA nursing facility  [] Individuals who are pateints or residents of a state owned/operated facility that is licensed by Department of Behavioral Services (DBHDS) and seek direct admission to VA nursing facility  [] A screening not required for enrollment in Medicaid Hospice services as set out in 12 VAC 30-  [] [facility] ([facility]) staff shall perform screenings of the [facility] clients.    Advanced Care Plan:  []Surrogate Decision Maker of Care  []POA  []Communicated

## 2024-08-01 NOTE — PLAN OF CARE
Problem: Discharge Planning  Goal: Discharge to home or other facility with appropriate resources  8/1/2024 1050 by Angy Velazquez LPN  Outcome: Adequate for Discharge  8/1/2024 1050 by Angy Velazquez LPN  Outcome: Progressing  8/1/2024 0828 by Angy Velazquez LPN  Outcome: Progressing  8/1/2024 0552 by Debo Ybarra, RN  Outcome: Progressing     Problem: Skin/Tissue Integrity  Goal: Absence of new skin breakdown  Description: 1.  Monitor for areas of redness and/or skin breakdown  2.  Assess vascular access sites hourly  3.  Every 4-6 hours minimum:  Change oxygen saturation probe site  4.  Every 4-6 hours:  If on nasal continuous positive airway pressure, respiratory therapy assess nares and determine need for appliance change or resting period.  8/1/2024 1050 by Angy Velazquez LPN  Outcome: Adequate for Discharge  8/1/2024 0552 by Debo Ybarra, RN  Outcome: Progressing     Problem: Safety - Adult  Goal: Free from fall injury  8/1/2024 1050 by Angy Velazquez LPN  Outcome: Adequate for Discharge  8/1/2024 0552 by Debo Ybarra, RN  Outcome: Progressing     Problem: Pain  Goal: Verbalizes/displays adequate comfort level or baseline comfort level  8/1/2024 1050 by Angy Velazquez LPN  Outcome: Adequate for Discharge  8/1/2024 0552 by Debo Ybarra, RN  Outcome: Progressing

## 2024-08-01 NOTE — DISCHARGE SUMMARY
Discharge Summary       PATIENT ID: Jayden Thompson  MRN: [id number]   YOB: 1950    DATE OF ADMISSION: 7/25/2024  9:13 PM    DATE OF DISCHARGE: 8/1/2024   PRIMARY CARE PROVIDER: No primary care provider on file.     ATTENDING PHYSICIAN: Lucinda Parker MD  DISCHARGING PROVIDER: David M Milligram, PA-C    To contact this individual call [phone] and ask the  to page.  If unavailable ask to be transferred the Adult Hospitalist Department.    CONSULTATIONS: IP CONSULT TO HOSPITALIST  IP CONSULT TO CASE MANAGEMENT    PROCEDURES/SURGERIES: Procedure(s):  RIGHT HIP CLOSED REDUCTION     ADMITTING DIAGNOSES & HOSPITAL COURSE:   Mr. Thompson is a 73 y.o. male with past medical history significant for type 2 diabetes, dyslipidemia, hypothyroidism, anxiety/depression, BPH who presented to the ED with right hip pain - has a hx of right hip replacement (2017) and has had a recent admission to [facility] on 7/10/2024 after a ground level fall which resulted in a right hip periprosthetic femur fracture. He was taken to the OR on 7/11/2024 and underwent a total revision of R hip and post - op was admitted to [facility]. Patient reported that he had leaned over in bed and attempted to pick an object up when he felt a sudden\"sound\" in the right hip followed by immediate pain 8/10 in severity     DISCHARGE DIAGNOSES / PLAN:      Dislocation of prosthesis of right hip POA 7/26  - underwent right hip closed reduction under general anesthesia in OR 7/26  - continue pain mgmt   - lovenox for DVT ppx  - PT/OT following  - Ortho following: WBAT RLE, strict posterior hip precautions, use knee immobilizer when ambulating, use abduction pillow between legs when in bed  - will need outpatient follow up with Dr. Oliveira ([facility])     Anxiety/depression   - continue Wellbutrin    Dyslipidemia   - continue Lipitor.    CKD, stage 3  - Cr 1.7 on 7/15 d/c paperwork from [facility] -- creatinine 1.71  - follow labs     Type 2 diabetes   -

## 2024-08-01 NOTE — PLAN OF CARE
Problem: Discharge Planning  Goal: Discharge to home or other facility with appropriate resources  8/1/2024 0828 by Angy Velazquez LPN  Outcome: Progressing  8/1/2024 0552 by Debo Ybarra, RN  Outcome: Progressing     Problem: Skin/Tissue Integrity  Goal: Absence of new skin breakdown  Description: 1.  Monitor for areas of redness and/or skin breakdown  2.  Assess vascular access sites hourly  3.  Every 4-6 hours minimum:  Change oxygen saturation probe site  4.  Every 4-6 hours:  If on nasal continuous positive airway pressure, respiratory therapy assess nares and determine need for appliance change or resting period.  8/1/2024 0552 by Debo Ybarra, RN  Outcome: Progressing     Problem: Safety - Adult  Goal: Free from fall injury  8/1/2024 0552 by Debo Ybarra, RN  Outcome: Progressing     Problem: Pain  Goal: Verbalizes/displays adequate comfort level or baseline comfort level  8/1/2024 0552 by Debo Ybarra, RN  Outcome: Progressing